# Patient Record
Sex: FEMALE | Race: WHITE | NOT HISPANIC OR LATINO | Employment: FULL TIME | URBAN - METROPOLITAN AREA
[De-identification: names, ages, dates, MRNs, and addresses within clinical notes are randomized per-mention and may not be internally consistent; named-entity substitution may affect disease eponyms.]

---

## 2017-08-23 DIAGNOSIS — Z92.89 HISTORY OF POSITIVE PPD: Primary | ICD-10-CM

## 2017-08-24 ENCOUNTER — HOSPITAL ENCOUNTER (OUTPATIENT)
Dept: LAB | Facility: CLINIC | Age: 37
Discharge: HOME | End: 2017-08-24
Attending: FAMILY MEDICINE | Admitting: PEDIATRICS
Payer: COMMERCIAL

## 2017-08-24 DIAGNOSIS — Z92.89 HISTORY OF POSITIVE PPD: ICD-10-CM

## 2017-08-24 PROCEDURE — 86480 TB TEST CELL IMMUN MEASURE: CPT | Performed by: PEDIATRICS

## 2017-08-24 PROCEDURE — 36415 COLL VENOUS BLD VENIPUNCTURE: CPT | Performed by: PEDIATRICS

## 2017-08-28 LAB
M TB TUBERC IFN-G BLD QL: NEGATIVE
M TB TUBERC IFN-G/MITOGEN IGNF BLD: 0 IU/ML

## 2020-03-11 ENCOUNTER — HEALTH MAINTENANCE LETTER (OUTPATIENT)
Age: 40
End: 2020-03-11

## 2020-12-27 ENCOUNTER — HEALTH MAINTENANCE LETTER (OUTPATIENT)
Age: 40
End: 2020-12-27

## 2021-03-09 ENCOUNTER — TRANSFERRED RECORDS (OUTPATIENT)
Dept: HEALTH INFORMATION MANAGEMENT | Facility: CLINIC | Age: 41
End: 2021-03-09

## 2021-04-25 ENCOUNTER — HEALTH MAINTENANCE LETTER (OUTPATIENT)
Age: 41
End: 2021-04-25

## 2021-06-08 ENCOUNTER — MEDICAL CORRESPONDENCE (OUTPATIENT)
Dept: HEALTH INFORMATION MANAGEMENT | Facility: CLINIC | Age: 41
End: 2021-06-08

## 2021-06-08 ENCOUNTER — HOSPITAL ENCOUNTER (EMERGENCY)
Facility: CLINIC | Age: 41
Discharge: HOME | End: 2021-06-08
Attending: PHYSICIAN ASSISTANT | Admitting: PHYSICIAN ASSISTANT
Payer: COMMERCIAL

## 2021-06-08 ENCOUNTER — APPOINTMENT (OUTPATIENT)
Dept: CT IMAGING | Facility: CLINIC | Age: 41
End: 2021-06-08
Attending: PHYSICIAN ASSISTANT
Payer: COMMERCIAL

## 2021-06-08 VITALS
DIASTOLIC BLOOD PRESSURE: 66 MMHG | TEMPERATURE: 98 F | RESPIRATION RATE: 14 BRPM | SYSTOLIC BLOOD PRESSURE: 94 MMHG | HEART RATE: 93 BPM | OXYGEN SATURATION: 100 %

## 2021-06-08 DIAGNOSIS — R42 VERTIGO: ICD-10-CM

## 2021-06-08 DIAGNOSIS — R51.9 HEADACHE: ICD-10-CM

## 2021-06-08 LAB
ANION GAP SERPL CALCULATED.3IONS-SCNC: 6 MMOL/L (ref 3–14)
BASOPHILS # BLD AUTO: 0.1 10E9/L (ref 0–0.2)
BASOPHILS NFR BLD AUTO: 1.1 %
BUN SERPL-MCNC: 12 MG/DL (ref 7–30)
CALCIUM SERPL-MCNC: 8.7 MG/DL (ref 8.5–10.1)
CHLORIDE SERPL-SCNC: 109 MMOL/L (ref 94–109)
CO2 SERPL-SCNC: 27 MMOL/L (ref 20–32)
CREAT SERPL-MCNC: 0.8 MG/DL (ref 0.52–1.04)
DIFFERENTIAL METHOD BLD: NORMAL
EOSINOPHIL # BLD AUTO: 0.1 10E9/L (ref 0–0.7)
EOSINOPHIL NFR BLD AUTO: 1.8 %
ERYTHROCYTE [DISTWIDTH] IN BLOOD BY AUTOMATED COUNT: 11.9 % (ref 10–15)
GFR SERPL CREATININE-BSD FRML MDRD: >90 ML/MIN/{1.73_M2}
GLUCOSE SERPL-MCNC: 93 MG/DL (ref 70–99)
HCG SERPL QL: NEGATIVE
HCT VFR BLD AUTO: 41.2 % (ref 35–47)
HGB BLD-MCNC: 13.8 G/DL (ref 11.7–15.7)
IMM GRANULOCYTES # BLD: 0 10E9/L (ref 0–0.4)
IMM GRANULOCYTES NFR BLD: 0.7 %
LYMPHOCYTES # BLD AUTO: 1.3 10E9/L (ref 0.8–5.3)
LYMPHOCYTES NFR BLD AUTO: 28.5 %
MCH RBC QN AUTO: 30.3 PG (ref 26.5–33)
MCHC RBC AUTO-ENTMCNC: 33.5 G/DL (ref 31.5–36.5)
MCV RBC AUTO: 91 FL (ref 78–100)
MONOCYTES # BLD AUTO: 0.4 10E9/L (ref 0–1.3)
MONOCYTES NFR BLD AUTO: 9.9 %
NEUTROPHILS # BLD AUTO: 2.6 10E9/L (ref 1.6–8.3)
NEUTROPHILS NFR BLD AUTO: 58 %
NRBC # BLD AUTO: 0 10*3/UL
NRBC BLD AUTO-RTO: 0 /100
PLATELET # BLD AUTO: 228 10E9/L (ref 150–450)
POTASSIUM SERPL-SCNC: 3.6 MMOL/L (ref 3.4–5.3)
RBC # BLD AUTO: 4.55 10E12/L (ref 3.8–5.2)
SODIUM SERPL-SCNC: 142 MMOL/L (ref 133–144)
WBC # BLD AUTO: 4.5 10E9/L (ref 4–11)

## 2021-06-08 PROCEDURE — 258N000003 HC RX IP 258 OP 636: Performed by: PHYSICIAN ASSISTANT

## 2021-06-08 PROCEDURE — 96361 HYDRATE IV INFUSION ADD-ON: CPT

## 2021-06-08 PROCEDURE — 250N000011 HC RX IP 250 OP 636: Performed by: PHYSICIAN ASSISTANT

## 2021-06-08 PROCEDURE — 80048 BASIC METABOLIC PNL TOTAL CA: CPT | Performed by: PHYSICIAN ASSISTANT

## 2021-06-08 PROCEDURE — 36415 COLL VENOUS BLD VENIPUNCTURE: CPT | Performed by: PHYSICIAN ASSISTANT

## 2021-06-08 PROCEDURE — 93005 ELECTROCARDIOGRAM TRACING: CPT

## 2021-06-08 PROCEDURE — 85025 COMPLETE CBC W/AUTO DIFF WBC: CPT | Performed by: PHYSICIAN ASSISTANT

## 2021-06-08 PROCEDURE — 84703 CHORIONIC GONADOTROPIN ASSAY: CPT | Performed by: PHYSICIAN ASSISTANT

## 2021-06-08 PROCEDURE — 70496 CT ANGIOGRAPHY HEAD: CPT

## 2021-06-08 PROCEDURE — 96374 THER/PROPH/DIAG INJ IV PUSH: CPT | Mod: 59

## 2021-06-08 PROCEDURE — 250N000009 HC RX 250: Performed by: PHYSICIAN ASSISTANT

## 2021-06-08 PROCEDURE — 99285 EMERGENCY DEPT VISIT HI MDM: CPT | Mod: 25

## 2021-06-08 PROCEDURE — 70450 CT HEAD/BRAIN W/O DYE: CPT

## 2021-06-08 PROCEDURE — 96375 TX/PRO/DX INJ NEW DRUG ADDON: CPT

## 2021-06-08 RX ORDER — SODIUM CHLORIDE 9 MG/ML
INJECTION, SOLUTION INTRAVENOUS CONTINUOUS
Status: DISCONTINUED | OUTPATIENT
Start: 2021-06-08 | End: 2021-06-08 | Stop reason: HOSPADM

## 2021-06-08 RX ORDER — KETOROLAC TROMETHAMINE 15 MG/ML
15 INJECTION, SOLUTION INTRAMUSCULAR; INTRAVENOUS ONCE
Status: COMPLETED | OUTPATIENT
Start: 2021-06-08 | End: 2021-06-08

## 2021-06-08 RX ORDER — IOPAMIDOL 755 MG/ML
500 INJECTION, SOLUTION INTRAVASCULAR ONCE
Status: COMPLETED | OUTPATIENT
Start: 2021-06-08 | End: 2021-06-08

## 2021-06-08 RX ORDER — METOCLOPRAMIDE HYDROCHLORIDE 5 MG/ML
10 INJECTION INTRAMUSCULAR; INTRAVENOUS ONCE
Status: COMPLETED | OUTPATIENT
Start: 2021-06-08 | End: 2021-06-08

## 2021-06-08 RX ORDER — DIPHENHYDRAMINE HYDROCHLORIDE 50 MG/ML
25 INJECTION INTRAMUSCULAR; INTRAVENOUS ONCE
Status: COMPLETED | OUTPATIENT
Start: 2021-06-08 | End: 2021-06-08

## 2021-06-08 RX ORDER — MECLIZINE HYDROCHLORIDE 25 MG/1
25 TABLET ORAL 3 TIMES DAILY PRN
Qty: 30 TABLET | Refills: 0 | Status: SHIPPED | OUTPATIENT
Start: 2021-06-08

## 2021-06-08 RX ORDER — DEXAMETHASONE SODIUM PHOSPHATE 10 MG/ML
10 INJECTION, SOLUTION INTRAMUSCULAR; INTRAVENOUS ONCE
Status: COMPLETED | OUTPATIENT
Start: 2021-06-08 | End: 2021-06-08

## 2021-06-08 RX ADMIN — METOCLOPRAMIDE HYDROCHLORIDE 10 MG: 5 INJECTION INTRAMUSCULAR; INTRAVENOUS at 12:52

## 2021-06-08 RX ADMIN — SODIUM CHLORIDE 1000 ML: 9 INJECTION, SOLUTION INTRAVENOUS at 12:52

## 2021-06-08 RX ADMIN — KETOROLAC TROMETHAMINE 15 MG: 15 INJECTION, SOLUTION INTRAMUSCULAR; INTRAVENOUS at 12:52

## 2021-06-08 RX ADMIN — IOPAMIDOL 70 ML: 755 INJECTION, SOLUTION INTRAVENOUS at 13:41

## 2021-06-08 RX ADMIN — SODIUM CHLORIDE 80 ML: 9 INJECTION, SOLUTION INTRAVENOUS at 13:41

## 2021-06-08 RX ADMIN — DEXAMETHASONE SODIUM PHOSPHATE 10 MG: 10 INJECTION, SOLUTION INTRAMUSCULAR; INTRAVENOUS at 12:52

## 2021-06-08 RX ADMIN — DIPHENHYDRAMINE HYDROCHLORIDE 25 MG: 50 INJECTION INTRAMUSCULAR; INTRAVENOUS at 12:52

## 2021-06-08 ASSESSMENT — ENCOUNTER SYMPTOMS
SPEECH DIFFICULTY: 1
HEADACHES: 1
DIZZINESS: 1
NUMBNESS: 0

## 2021-06-08 NOTE — ED TRIAGE NOTES
Patient presents with intermittent dizziness for past month, history of migraines. Dizziness is getting worse in the past 2 weeks. MRI normal 1 month ago. Slight headache on left side.

## 2021-06-08 NOTE — ED PROVIDER NOTES
"  History   Chief Complaint:  Dizziness       The history is provided by the patient.      Aretha Zavala is a 40 year old female with a history of migraines who presents with 2 weeks of intermittent dizziness. The patient reports that these episodes are spontaneous, but does feel that they occur more frequently when she stands up quickly or walks at a fast pace. She does clarify that her symptoms are less of a spinning dizziness and moreso feel like she is being \"pulled over to the left\". Symptoms do improves slightly when she sits down. Episodes typically last for a few minutes, though she did have an episode today that has been ongoing since onset at 8:30 a.m. When her dizziness is at its worst, she feels that she has to focus harder to see out of her left eye, but does not explicitly endorse any visual disturbance. The patient reports that she is still able to speak clearly during the episodes, but does feel that \"words get stuck\". She also notes that she had a headache today that felt different than her usual history of migraines, reports feeling more pressure. The patient denies any numbness to her face, arms, or legs. She further denies any recent trauma or injury. Patient is not currently anticoagulated.    Review of Systems   Eyes: Visual disturbance: has to focus more with L eye.   Neurological: Positive for dizziness, speech difficulty (\"words get stuck\", able to speak clearly) and headaches. Negative for numbness.   All other systems reviewed and are negative.    Allergies:  No Known Drug Allergies    Medications:  Hydroxychloroquine  Rizatriptan  Albuterol inhaler  Bupropion  Omeprazole  Symbicort inhaler    Past Medical History:    Anxiety  Depression  Inflammatory arthritis  Migraine headaches  Subclinical hypothyroidism  Asthma  GERD    Past Surgical History:    Tonsillectomy  [place] teeth extraction   section x2  Pelvic laparoscopy    Family History:    Father - diabetes  Social History:  The " patient was accompanied to the ED by her .  Marital status: Marries  Smoking Status: Negative  Alcohol Use: Negative    Physical Exam     Patient Vitals for the past 24 hrs:   BP Temp Temp src Pulse Resp SpO2   06/08/21 1330 96/65 -- -- 75 -- 100 %   06/08/21 1315 92/53 -- -- 102 -- 97 %   06/08/21 1300 106/70 -- -- -- -- 100 %   06/08/21 1210 116/82 98  F (36.7  C) Temporal 91 14 100 %     Physical Exam  Vitals signs and nursing note reviewed.   Constitutional:       General: She is not in acute distress.     Appearance: She is not diaphoretic.   HENT:      Head: Normocephalic and atraumatic.      Mouth/Throat:      Pharynx: No oropharyngeal exudate.   Eyes:      General: No scleral icterus.     Extraocular Movements: Extraocular movements intact.      Pupils: Pupils are equal, round, and reactive to light.      Comments: Uniclonal horizontal nystagmus. No gross visual field deficits   Cardiovascular:      Heart sounds: Normal heart sounds.   Pulmonary:      Effort: No respiratory distress.      Breath sounds: Normal breath sounds.   Abdominal:      General: Bowel sounds are normal.      Palpations: Abdomen is soft.      Tenderness: There is no abdominal tenderness.   Musculoskeletal:         General: No tenderness.   Skin:     General: Skin is warm.      Capillary Refill: Capillary refill takes 2 to 3 seconds.      Findings: No rash.   Neurological:      Mental Status: She is oriented to person, place, and time. Mental status is at baseline.      Cranial Nerves: No cranial nerve deficit.      Motor: No weakness.      Comments: CN intact, motor intact, sensory intact, no deficit finger-finger-nose or heel-shin testing, unremarkable HINTS examination for central process       Emergency Department Course     ECG:  Indication: Dizziness  Completed at 1316.  Read at 1317 by Dr. Lieberman.   Normal sinus rhythm  Possible Left atrial enlargement  Low voltage QRS  Nonspecific St abnormality  Prolonged QT   Rate 100  bpm. KS interval 114. QRS duration 72. QT/QTc 380/490. P-R-T axes 78 80 61.    Imaging:  Head CT w/o contrast:  Normal head CT.   Report per radiology.    CTA Head Neck with Contrast:  Normal neck and head CTA.  Report per radiology.    Laboratory:  CBC: WBC 4.5, HGB 13.8,   BMP: AWNL (Creatinine 0.80)    HCG qualitative blood: Negative    Emergency Department Course:    Reviewed:  I reviewed the patient's nursing notes, vitals and care everywhere.     Assessments:  1230 I performed an exam of the patient in room 29 as documented above.  1416 Patient rechecked and updated on findings.      Interventions:  1252 NS 1L IV Bolus  1252 Reglan 10 mg IV  1252 Benadryl 25 mg IV  1252 Decadron 10 mg IV  1252 Toradol 15 mg IV    Disposition:  The patient was discharged to home.     Impression & Plan     Medical Decision Making:  Aretha Zavala is a 40 year old female who presents with headache, vertigo. She has no focal neurological deficits and clinically appears most c/w peripheral vertigo. MR imaging as an outpatient was reassuring. Given her persistent headaches and symptoms, CTA obtained to evaluate for obvious vertebral artery dissection or abnormality, CT head obtained to evaluate for subarachnoid hemorrhage. These were unremarkable. Her symptoms improved in the ED. Plan for outpatient symptomatic management and ENT follow up.     Diagnosis:    ICD-10-CM    1. Vertigo  R42    2. Headache  R51.9        Discharge Medications:   New Prescriptions    MECLIZINE (ANTIVERT) 25 MG TABLET    Take 1 tablet (25 mg) by mouth 3 times daily as needed for dizziness       Scribe Disclosure:  I, Nyla De Guzman, am serving as a scribe at 12:30 PM on 6/8/2021 to document services personally performed by José Luis Perea PA based on my observations and the provider's statements to me.     This note was completed in part using Dragon voice recognition software. Although reviewed after completion, some word and grammatical  errors may occur.     Nyla De Guzman  6/8/2021   [facility] EMERGENCY DEPARTMENT         José Luis Perea PA-C  06/08/21 0233

## 2021-06-08 NOTE — ED NOTES
"Pt states she feels a little anxious and \"pins and needles\" post IV medications. Pt states these symptoms. Per MD, this may be a side effect of the Reglan. Pt ok with monitoring symptoms for now.   "

## 2021-06-09 ENCOUNTER — TRANSCRIBE ORDERS (OUTPATIENT)
Dept: OTHER | Age: 41
End: 2021-06-09

## 2021-06-09 ENCOUNTER — TELEPHONE (OUTPATIENT)
Dept: OTOLARYNGOLOGY | Facility: CLINIC | Age: 41
End: 2021-06-09

## 2021-06-09 DIAGNOSIS — R42 VERTIGO: Primary | ICD-10-CM

## 2021-06-09 LAB — INTERPRETATION ECG - MUSE: NORMAL

## 2021-06-09 NOTE — TELEPHONE ENCOUNTER
[facility] Call Center    Phone Message    May a detailed message be left on voicemail: no     Reason for Call: Appointment Intake    Referring Provider Name: Dr. Ozzy Castillo at [facility]  Diagnosis and/or Symptoms: Vertigo    Action Taken: Message routed to:  [facility] CLINIC COORDINATORS-EYE-DENTAL-ENT- [[id number]]    Travel Screening: Not Applicable

## 2021-06-11 ENCOUNTER — TELEPHONE (OUTPATIENT)
Dept: OTOLARYNGOLOGY | Facility: CLINIC | Age: 41
End: 2021-06-11

## 2021-06-11 NOTE — TELEPHONE ENCOUNTER
1. Have you noticed any changes in hearing? Yes  2. Do you have ringing, buzzing, or other sounds in your ears or head, this is also referred to as Tinnitus? Sometimes: sometimes   3. When and where was your last hearing test? [facility] recently (didn't specify when)  4. Do you feel lightheaded or foggy? Yes  5. Do you have a spinning sensation? Yes  6. Is there any specific position that can bring on dizziness? Standing up- no position   7. Does looking up cause dizziness? No  8. Does getting in and our of bed cause dizziness? Yes  9. Does turning over in bed increase or cause dizziness? Yes  10. Does bending over cause dizziness? Yes  11. Is there anything that you can do to prevent the dizziness? Sit down or lay down   12. Has the dizziness gotten better with time? No  13. Have you seen Physical Therapy for dizziness? (Please indicate clinic and as much of the location as possible): No  14. Are you being referred to a specific physician? No  15. Have you been evaluated/treated for your dizziness at any other location?  (If yes,obtian as much clinic/provider/locaiton as possible) Yes. (If yes answer the following questions:)   Have you seen any ENT, Neurology, or other providers for these symptoms?             Yes, If yes, where? [facility] if yes, who?    Have you had any balance or Audiology testing? No Have you had an MRI or CT scan of your head or neck? Yes, If yes, where? [facility]- MRI                   CT - [facility]  if yes, who?     Would you like to receive your Release of Information by mail or e-mail?  e-mail

## 2021-06-14 NOTE — TELEPHONE ENCOUNTER
FUTURE VISIT INFORMATION      FUTURE VISIT INFORMATION:    Date: 8/24/201    Time: 10AM    [facility]  REFERRAL INFORMATION:    Referring provider:  Ozzy Castillo MD     Referring providers clinic:  [facility]      Reason for visit/diagnosis  Vertigo- Referred by Dr. Ozzy Castillo at [facility]    RECORDS REQUESTED FROM:       Clinic name Comments Records Status Imaging Status   [facility]   3/6/2021 note from Ozzy Castillo MD  Care everywhere     [facility] imaging  4/19/2021 MR Brain and US Thyroid Care everywhere  Req 6/14/21 - PACS   [facility]  6/8/2021 ED note with José Luis Perea PA-C  6/8/2021 CTA HEad NEck   6/8/2021 CT Head Epic PACS   [facility]  6/9/2021 audiogram with Hannah Iraheta AU.D.   req 6/14/2021, 6/24/21 - received                   6/14/2021 11:03AM sent a fax to [facility] for images and audio - Am[facility]  6/24/2021 1:55PM [facility] imaging received, called [facility] records for audio, audio will be sent today - Amay   *2:19PM audio received, sent to scan. Delayed message sent to audiology to review  - [facility]

## 2021-07-02 DIAGNOSIS — R42 DIZZINESS: Primary | ICD-10-CM

## 2021-07-02 NOTE — TELEPHONE ENCOUNTER
"Requested orders for hearing test, vestibular PT eval, and VNG testing prior to ENT appointment with Dr. Nissen on 8/24/21.      Per Chart Review: Patient was seen in ED on 6/8/21 for ongoing issues with intermittent dizziness for a few weeks, as well as headache/head pressure. Dizziness reportedly typically lasts for a few minutes. Patient reported when dizziness is bad it is hard to see out of the left eye, and occasionally \"words get stuck\". CT scan was unremarkable. Patient has a history of migraines.    Audio 3/9/21 from [facility] showed normal hearing bilaterally.     Dizzy Intake: Patient reported dizziness is triggered by getting in and out of bed, turning over in bed, and bending over.      Smiley Hartman.  Licensed Audiologist  [place] # [id number]    "

## 2021-07-09 ENCOUNTER — TELEPHONE (OUTPATIENT)
Dept: AUDIOLOGY | Facility: CLINIC | Age: 41
End: 2021-07-09

## 2021-07-20 NOTE — TELEPHONE ENCOUNTER
- LVM 2 x to schedule Vest PT, WIN, and VNG prior to Dr. Nissen. Gave direct number. Also stated this needs to get done prior to ENT    Gave [facility] number for physical therapy    Gave direct number for WIN and VNG

## 2021-07-30 NOTE — TELEPHONE ENCOUNTER
Patient said you can reach her today on her cell at [phone]. If she doesn't answer, please call her work at [phone] and someone can get her to talk.

## 2021-08-03 ENCOUNTER — HOSPITAL ENCOUNTER (OUTPATIENT)
Dept: PHYSICAL THERAPY | Facility: CLINIC | Age: 41
Setting detail: THERAPIES SERIES
End: 2021-08-03
Attending: OTOLARYNGOLOGY
Payer: COMMERCIAL

## 2021-08-03 PROCEDURE — 97162 PT EVAL MOD COMPLEX 30 MIN: CPT | Mod: GP | Performed by: PHYSICAL THERAPIST
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
## 2021-08-10 ENCOUNTER — TELEPHONE (OUTPATIENT)
Dept: AUDIOLOGY | Facility: CLINIC | Age: 41
End: 2021-08-10

## 2021-08-10 NOTE — TELEPHONE ENCOUNTER
"\"I m calling from the Audiology and Balance Testing department at the . This is just a call to remind you of your upcoming Balance Testing appointment on [Date], and to see if you have any questions or concerns regarding the balance testing you'll be doing. You should have received an itinerary via mail or via Vocab, if you are active, that goes over what to expect and explains the dos and don ts both 48 hours before, and the day of. There is a list of medications for you to review on the itinerary that we would like you to stop taking beforehand. If you didn t receive the itinerary or you still have questions, please give our clinic a call at [phone]. Otherwise, we will see you on [Date] starting at [Time].\"    Please send encounter if patient would like to reschedule.  "

## 2021-08-13 DIAGNOSIS — R42 DIZZINESS: Primary | ICD-10-CM

## 2021-08-16 ENCOUNTER — OFFICE VISIT (OUTPATIENT)
Dept: AUDIOLOGY | Facility: CLINIC | Age: 41
End: 2021-08-16
Payer: COMMERCIAL

## 2021-08-16 DIAGNOSIS — R42 DIZZINESS AND GIDDINESS: Primary | ICD-10-CM

## 2021-08-16 PROCEDURE — 92541 SPONTANEOUS NYSTAGMUS TEST: CPT | Performed by: AUDIOLOGIST

## 2021-08-16 PROCEDURE — 92542 POSITIONAL NYSTAGMUS TEST: CPT | Mod: 59 | Performed by: AUDIOLOGIST

## 2021-08-16 PROCEDURE — 92567 TYMPANOMETRY: CPT | Performed by: AUDIOLOGIST

## 2021-08-16 PROCEDURE — 92545 OSCILLATING TRACKING TEST: CPT | Mod: 59 | Performed by: AUDIOLOGIST

## 2021-08-16 PROCEDURE — 92537 CALORIC VSTBLR TEST W/REC: CPT | Performed by: AUDIOLOGIST
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
## 2021-08-24 ENCOUNTER — PRE VISIT (OUTPATIENT)
Dept: OTOLARYNGOLOGY | Facility: CLINIC | Age: 41
End: 2021-08-24

## 2021-09-05 ENCOUNTER — APPOINTMENT (OUTPATIENT)
Dept: GENERAL RADIOLOGY | Facility: CLINIC | Age: 41
End: 2021-09-05
Attending: EMERGENCY MEDICINE
Payer: COMMERCIAL

## 2021-09-05 ENCOUNTER — HOSPITAL ENCOUNTER (OUTPATIENT)
Facility: CLINIC | Age: 41
Setting detail: OBSERVATION
Discharge: HOME | End: 2021-09-06
Attending: EMERGENCY MEDICINE | Admitting: HOSPITALIST
Payer: COMMERCIAL

## 2021-09-05 DIAGNOSIS — J45.901 EXACERBATION OF ASTHMA, UNSPECIFIED ASTHMA SEVERITY, UNSPECIFIED WHETHER PERSISTENT: ICD-10-CM

## 2021-09-05 DIAGNOSIS — J20.5 ACUTE BRONCHITIS DUE TO RESPIRATORY SYNCYTIAL VIRUS (RSV): Primary | ICD-10-CM

## 2021-09-05 LAB
ANION GAP SERPL CALCULATED.3IONS-SCNC: 10 MMOL/L (ref 3–14)
BASOPHILS # BLD AUTO: 0.1 10E3/UL (ref 0–0.2)
BASOPHILS NFR BLD AUTO: 1 %
BUN SERPL-MCNC: 9 MG/DL (ref 7–30)
CALCIUM SERPL-MCNC: 9.3 MG/DL (ref 8.5–10.1)
CHLORIDE BLD-SCNC: 108 MMOL/L (ref 94–109)
CO2 SERPL-SCNC: 23 MMOL/L (ref 20–32)
CREAT SERPL-MCNC: 0.79 MG/DL (ref 0.52–1.04)
D DIMER PPP FEU-MCNC: <0.27 UG/ML FEU (ref 0–0.5)
EOSINOPHIL # BLD AUTO: 0.1 10E3/UL (ref 0–0.7)
EOSINOPHIL NFR BLD AUTO: 1 %
ERYTHROCYTE [DISTWIDTH] IN BLOOD BY AUTOMATED COUNT: 12.3 % (ref 10–15)
GFR SERPL CREATININE-BSD FRML MDRD: >90 ML/MIN/1.73M2
GLUCOSE BLD-MCNC: 86 MG/DL (ref 70–99)
HCT VFR BLD AUTO: 43.6 % (ref 35–47)
HGB BLD-MCNC: 14.9 G/DL (ref 11.7–15.7)
IMM GRANULOCYTES # BLD: 0.4 10E3/UL
IMM GRANULOCYTES NFR BLD: 3 %
LYMPHOCYTES # BLD AUTO: 1.6 10E3/UL (ref 0.8–5.3)
LYMPHOCYTES NFR BLD AUTO: 13 %
MCH RBC QN AUTO: 31 PG (ref 26.5–33)
MCHC RBC AUTO-ENTMCNC: 34.2 G/DL (ref 31.5–36.5)
MCV RBC AUTO: 91 FL (ref 78–100)
MONOCYTES # BLD AUTO: 1.2 10E3/UL (ref 0–1.3)
MONOCYTES NFR BLD AUTO: 10 %
NEUTROPHILS # BLD AUTO: 8.7 10E3/UL (ref 1.6–8.3)
NEUTROPHILS NFR BLD AUTO: 72 %
NRBC # BLD AUTO: 0 10E3/UL
NRBC BLD AUTO-RTO: 0 /100
PLATELET # BLD AUTO: 266 10E3/UL (ref 150–450)
POTASSIUM BLD-SCNC: 3 MMOL/L (ref 3.4–5.3)
POTASSIUM BLD-SCNC: 3.6 MMOL/L (ref 3.4–5.3)
RBC # BLD AUTO: 4.8 10E6/UL (ref 3.8–5.2)
SARS-COV-2 RNA RESP QL NAA+PROBE: NEGATIVE
SODIUM SERPL-SCNC: 141 MMOL/L (ref 133–144)
TROPONIN I SERPL-MCNC: <0.015 UG/L (ref 0–0.04)
WBC # BLD AUTO: 12.1 10E3/UL (ref 4–11)

## 2021-09-05 PROCEDURE — 87633 RESP VIRUS 12-25 TARGETS: CPT | Performed by: EMERGENCY MEDICINE

## 2021-09-05 PROCEDURE — 250N000013 HC RX MED GY IP 250 OP 250 PS 637: Performed by: PHYSICIAN ASSISTANT

## 2021-09-05 PROCEDURE — 96375 TX/PRO/DX INJ NEW DRUG ADDON: CPT

## 2021-09-05 PROCEDURE — 94640 AIRWAY INHALATION TREATMENT: CPT

## 2021-09-05 PROCEDURE — 258N000003 HC RX IP 258 OP 636: Performed by: EMERGENCY MEDICINE

## 2021-09-05 PROCEDURE — 250N000009 HC RX 250: Performed by: PHYSICIAN ASSISTANT

## 2021-09-05 PROCEDURE — 94640 AIRWAY INHALATION TREATMENT: CPT | Mod: 76

## 2021-09-05 PROCEDURE — 85025 COMPLETE CBC W/AUTO DIFF WBC: CPT | Performed by: EMERGENCY MEDICINE

## 2021-09-05 PROCEDURE — 87581 M.PNEUMON DNA AMP PROBE: CPT | Performed by: EMERGENCY MEDICINE

## 2021-09-05 PROCEDURE — 84484 ASSAY OF TROPONIN QUANT: CPT | Performed by: EMERGENCY MEDICINE

## 2021-09-05 PROCEDURE — 71045 X-RAY EXAM CHEST 1 VIEW: CPT

## 2021-09-05 PROCEDURE — 85379 FIBRIN DEGRADATION QUANT: CPT | Performed by: EMERGENCY MEDICINE

## 2021-09-05 PROCEDURE — C9803 HOPD COVID-19 SPEC COLLECT: HCPCS

## 2021-09-05 PROCEDURE — 36415 COLL VENOUS BLD VENIPUNCTURE: CPT | Performed by: PHYSICIAN ASSISTANT

## 2021-09-05 PROCEDURE — 250N000011 HC RX IP 250 OP 636: Performed by: EMERGENCY MEDICINE

## 2021-09-05 PROCEDURE — 250N000009 HC RX 250: Performed by: EMERGENCY MEDICINE

## 2021-09-05 PROCEDURE — 96361 HYDRATE IV INFUSION ADD-ON: CPT

## 2021-09-05 PROCEDURE — G0378 HOSPITAL OBSERVATION PER HR: HCPCS

## 2021-09-05 PROCEDURE — 99220 PR INITIAL OBSERVATION CARE,LEVEL III: CPT | Performed by: PHYSICIAN ASSISTANT

## 2021-09-05 PROCEDURE — 93005 ELECTROCARDIOGRAM TRACING: CPT

## 2021-09-05 PROCEDURE — 99285 EMERGENCY DEPT VISIT HI MDM: CPT | Mod: 25

## 2021-09-05 PROCEDURE — 250N000009 HC RX 250

## 2021-09-05 PROCEDURE — 96365 THER/PROPH/DIAG IV INF INIT: CPT

## 2021-09-05 PROCEDURE — 258N000003 HC RX IP 258 OP 636: Performed by: PHYSICIAN ASSISTANT

## 2021-09-05 PROCEDURE — 84132 ASSAY OF SERUM POTASSIUM: CPT | Performed by: PHYSICIAN ASSISTANT

## 2021-09-05 PROCEDURE — 80048 BASIC METABOLIC PNL TOTAL CA: CPT | Performed by: EMERGENCY MEDICINE

## 2021-09-05 PROCEDURE — 999N000157 HC STATISTIC RCP TIME EA 10 MIN

## 2021-09-05 PROCEDURE — 36415 COLL VENOUS BLD VENIPUNCTURE: CPT | Performed by: EMERGENCY MEDICINE

## 2021-09-05 PROCEDURE — 87635 SARS-COV-2 COVID-19 AMP PRB: CPT | Performed by: EMERGENCY MEDICINE

## 2021-09-05 RX ORDER — PROCHLORPERAZINE MALEATE 5 MG
10 TABLET ORAL EVERY 6 HOURS PRN
Status: DISCONTINUED | OUTPATIENT
Start: 2021-09-05 | End: 2021-09-06 | Stop reason: HOSPADM

## 2021-09-05 RX ORDER — POLYETHYLENE GLYCOL 3350 17 G/17G
17 POWDER, FOR SOLUTION ORAL DAILY PRN
Status: DISCONTINUED | OUTPATIENT
Start: 2021-09-05 | End: 2021-09-06 | Stop reason: HOSPADM

## 2021-09-05 RX ORDER — AMOXICILLIN 250 MG
2 CAPSULE ORAL 2 TIMES DAILY PRN
Status: DISCONTINUED | OUTPATIENT
Start: 2021-09-05 | End: 2021-09-06 | Stop reason: HOSPADM

## 2021-09-05 RX ORDER — METHYLPREDNISOLONE SODIUM SUCCINATE 125 MG/2ML
60 INJECTION, POWDER, LYOPHILIZED, FOR SOLUTION INTRAMUSCULAR; INTRAVENOUS EVERY 12 HOURS
Status: DISCONTINUED | OUTPATIENT
Start: 2021-09-06 | End: 2021-09-06 | Stop reason: HOSPADM

## 2021-09-05 RX ORDER — ONDANSETRON 2 MG/ML
4 INJECTION INTRAMUSCULAR; INTRAVENOUS EVERY 6 HOURS PRN
Status: DISCONTINUED | OUTPATIENT
Start: 2021-09-05 | End: 2021-09-06 | Stop reason: HOSPADM

## 2021-09-05 RX ORDER — IPRATROPIUM BROMIDE AND ALBUTEROL SULFATE 2.5; .5 MG/3ML; MG/3ML
3 SOLUTION RESPIRATORY (INHALATION)
Status: DISCONTINUED | OUTPATIENT
Start: 2021-09-05 | End: 2021-09-06 | Stop reason: HOSPADM

## 2021-09-05 RX ORDER — ALBUTEROL SULFATE 0.83 MG/ML
2.5 SOLUTION RESPIRATORY (INHALATION) ONCE
Status: COMPLETED | OUTPATIENT
Start: 2021-09-05 | End: 2021-09-05

## 2021-09-05 RX ORDER — IPRATROPIUM BROMIDE AND ALBUTEROL SULFATE 2.5; .5 MG/3ML; MG/3ML
6 SOLUTION RESPIRATORY (INHALATION) ONCE
Status: COMPLETED | OUTPATIENT
Start: 2021-09-05 | End: 2021-09-05

## 2021-09-05 RX ORDER — PROCHLORPERAZINE 25 MG
25 SUPPOSITORY, RECTAL RECTAL EVERY 12 HOURS PRN
Status: DISCONTINUED | OUTPATIENT
Start: 2021-09-05 | End: 2021-09-06 | Stop reason: HOSPADM

## 2021-09-05 RX ORDER — SODIUM CHLORIDE, SODIUM LACTATE, POTASSIUM CHLORIDE, CALCIUM CHLORIDE 600; 310; 30; 20 MG/100ML; MG/100ML; MG/100ML; MG/100ML
INJECTION, SOLUTION INTRAVENOUS CONTINUOUS
Status: DISCONTINUED | OUTPATIENT
Start: 2021-09-05 | End: 2021-09-06 | Stop reason: HOSPADM

## 2021-09-05 RX ORDER — LEVOCETIRIZINE DIHYDROCHLORIDE 5 MG/1
5 TABLET, FILM COATED ORAL EVERY EVENING
COMMUNITY

## 2021-09-05 RX ORDER — BUPROPION HYDROCHLORIDE 300 MG/1
300 TABLET ORAL EVERY EVENING
COMMUNITY

## 2021-09-05 RX ORDER — BENZONATATE 100 MG/1
100 CAPSULE ORAL 3 TIMES DAILY PRN
Status: DISCONTINUED | OUTPATIENT
Start: 2021-09-05 | End: 2021-09-06 | Stop reason: HOSPADM

## 2021-09-05 RX ORDER — GUAIFENESIN 600 MG/1
600 TABLET, EXTENDED RELEASE ORAL 2 TIMES DAILY
Status: DISCONTINUED | OUTPATIENT
Start: 2021-09-05 | End: 2021-09-06 | Stop reason: HOSPADM

## 2021-09-05 RX ORDER — LEVOCETIRIZINE DIHYDROCHLORIDE 5 MG/1
5 TABLET, FILM COATED ORAL EVERY EVENING
Status: DISCONTINUED | OUTPATIENT
Start: 2021-09-05 | End: 2021-09-06 | Stop reason: HOSPADM

## 2021-09-05 RX ORDER — AMOXICILLIN 250 MG
1 CAPSULE ORAL 2 TIMES DAILY PRN
Status: DISCONTINUED | OUTPATIENT
Start: 2021-09-05 | End: 2021-09-06 | Stop reason: HOSPADM

## 2021-09-05 RX ORDER — HYDROXYCHLOROQUINE SULFATE 200 MG/1
400 TABLET, FILM COATED ORAL EVERY EVENING
COMMUNITY

## 2021-09-05 RX ORDER — MAGNESIUM SULFATE HEPTAHYDRATE 40 MG/ML
2 INJECTION, SOLUTION INTRAVENOUS ONCE
Status: COMPLETED | OUTPATIENT
Start: 2021-09-05 | End: 2021-09-05

## 2021-09-05 RX ORDER — IPRATROPIUM BROMIDE AND ALBUTEROL SULFATE 2.5; .5 MG/3ML; MG/3ML
SOLUTION RESPIRATORY (INHALATION)
Status: COMPLETED
Start: 2021-09-05 | End: 2021-09-05

## 2021-09-05 RX ORDER — HYDROXYCHLOROQUINE SULFATE 200 MG/1
400 TABLET, FILM COATED ORAL EVERY EVENING
Status: DISCONTINUED | OUTPATIENT
Start: 2021-09-05 | End: 2021-09-06 | Stop reason: HOSPADM

## 2021-09-05 RX ORDER — GUAIFENESIN, PSEUDOEPHEDRINE HYDROCHLORIDE 600; 60 MG/1; MG/1
1 TABLET, EXTENDED RELEASE ORAL 2 TIMES DAILY
Status: DISCONTINUED | OUTPATIENT
Start: 2021-09-05 | End: 2021-09-05 | Stop reason: RX

## 2021-09-05 RX ORDER — BUDESONIDE AND FORMOTEROL FUMARATE DIHYDRATE 160; 4.5 UG/1; UG/1
2 AEROSOL RESPIRATORY (INHALATION) 2 TIMES DAILY
Status: DISCONTINUED | OUTPATIENT
Start: 2021-09-05 | End: 2021-09-06

## 2021-09-05 RX ORDER — IBUPROFEN 600 MG/1
600 TABLET, FILM COATED ORAL EVERY 6 HOURS PRN
Status: DISCONTINUED | OUTPATIENT
Start: 2021-09-05 | End: 2021-09-06 | Stop reason: HOSPADM

## 2021-09-05 RX ORDER — ACETAMINOPHEN 325 MG/1
650 TABLET ORAL EVERY 6 HOURS PRN
Status: DISCONTINUED | OUTPATIENT
Start: 2021-09-05 | End: 2021-09-06 | Stop reason: HOSPADM

## 2021-09-05 RX ORDER — AZITHROMYCIN 250 MG/1
500 TABLET, FILM COATED ORAL ONCE
Status: DISCONTINUED | OUTPATIENT
Start: 2021-09-05 | End: 2021-09-05

## 2021-09-05 RX ORDER — ALBUTEROL SULFATE 0.83 MG/ML
2.5 SOLUTION RESPIRATORY (INHALATION)
Status: DISCONTINUED | OUTPATIENT
Start: 2021-09-05 | End: 2021-09-06 | Stop reason: HOSPADM

## 2021-09-05 RX ORDER — ALBUTEROL SULFATE 90 UG/1
2 AEROSOL, METERED RESPIRATORY (INHALATION) EVERY 6 HOURS PRN
COMMUNITY

## 2021-09-05 RX ORDER — MECLIZINE HYDROCHLORIDE 25 MG/1
25 TABLET ORAL 3 TIMES DAILY PRN
Status: DISCONTINUED | OUTPATIENT
Start: 2021-09-05 | End: 2021-09-06 | Stop reason: HOSPADM

## 2021-09-05 RX ORDER — BUDESONIDE AND FORMOTEROL FUMARATE DIHYDRATE 160; 4.5 UG/1; UG/1
2 AEROSOL RESPIRATORY (INHALATION) 2 TIMES DAILY
COMMUNITY

## 2021-09-05 RX ORDER — DOXYCYCLINE 100 MG/1
100 CAPSULE ORAL EVERY 12 HOURS SCHEDULED
Status: DISCONTINUED | OUTPATIENT
Start: 2021-09-05 | End: 2021-09-06 | Stop reason: HOSPADM

## 2021-09-05 RX ORDER — BUPROPION HYDROCHLORIDE 150 MG/1
300 TABLET ORAL EVERY EVENING
Status: DISCONTINUED | OUTPATIENT
Start: 2021-09-05 | End: 2021-09-06 | Stop reason: HOSPADM

## 2021-09-05 RX ORDER — RIZATRIPTAN BENZOATE 10 MG/1
10 TABLET, ORALLY DISINTEGRATING ORAL
COMMUNITY
End: 2022-01-26

## 2021-09-05 RX ORDER — ACETAMINOPHEN 650 MG/1
650 SUPPOSITORY RECTAL EVERY 6 HOURS PRN
Status: DISCONTINUED | OUTPATIENT
Start: 2021-09-05 | End: 2021-09-06 | Stop reason: HOSPADM

## 2021-09-05 RX ORDER — RIZATRIPTAN BENZOATE 10 MG/1
10 TABLET, ORALLY DISINTEGRATING ORAL
Status: DISCONTINUED | OUTPATIENT
Start: 2021-09-05 | End: 2021-09-06 | Stop reason: HOSPADM

## 2021-09-05 RX ORDER — AZITHROMYCIN 250 MG/1
250 TABLET, FILM COATED ORAL DAILY
Status: DISCONTINUED | OUTPATIENT
Start: 2021-09-06 | End: 2021-09-05

## 2021-09-05 RX ORDER — ONDANSETRON 4 MG/1
4 TABLET, ORALLY DISINTEGRATING ORAL EVERY 6 HOURS PRN
Status: DISCONTINUED | OUTPATIENT
Start: 2021-09-05 | End: 2021-09-06 | Stop reason: HOSPADM

## 2021-09-05 RX ORDER — METHYLPREDNISOLONE SODIUM SUCCINATE 125 MG/2ML
125 INJECTION, POWDER, LYOPHILIZED, FOR SOLUTION INTRAMUSCULAR; INTRAVENOUS ONCE
Status: COMPLETED | OUTPATIENT
Start: 2021-09-05 | End: 2021-09-05

## 2021-09-05 RX ORDER — PSEUDOEPHEDRINE HCL 30 MG
30 TABLET ORAL 4 TIMES DAILY
Status: DISCONTINUED | OUTPATIENT
Start: 2021-09-05 | End: 2021-09-06

## 2021-09-05 RX ORDER — IPRATROPIUM BROMIDE AND ALBUTEROL SULFATE 2.5; .5 MG/3ML; MG/3ML
6 SOLUTION RESPIRATORY (INHALATION) ONCE
Status: DISCONTINUED | OUTPATIENT
Start: 2021-09-05 | End: 2021-09-05

## 2021-09-05 RX ORDER — POTASSIUM CHLORIDE 1500 MG/1
40 TABLET, EXTENDED RELEASE ORAL ONCE
Status: COMPLETED | OUTPATIENT
Start: 2021-09-05 | End: 2021-09-05

## 2021-09-05 RX ADMIN — Medication 1 LOZENGE: at 22:52

## 2021-09-05 RX ADMIN — GUAIFENESIN 600 MG: 600 TABLET, EXTENDED RELEASE ORAL at 19:50

## 2021-09-05 RX ADMIN — BUPROPION HYDROCHLORIDE 300 MG: 150 TABLET, EXTENDED RELEASE ORAL at 19:50

## 2021-09-05 RX ADMIN — ALBUTEROL SULFATE 2.5 MG: 2.5 SOLUTION RESPIRATORY (INHALATION) at 13:32

## 2021-09-05 RX ADMIN — SODIUM CHLORIDE, POTASSIUM CHLORIDE, SODIUM LACTATE AND CALCIUM CHLORIDE: 600; 310; 30; 20 INJECTION, SOLUTION INTRAVENOUS at 16:14

## 2021-09-05 RX ADMIN — SODIUM CHLORIDE, POTASSIUM CHLORIDE, SODIUM LACTATE AND CALCIUM CHLORIDE 1000 ML: 600; 310; 30; 20 INJECTION, SOLUTION INTRAVENOUS at 14:26

## 2021-09-05 RX ADMIN — HYDROXYCHLOROQUINE SULFATE 400 MG: 200 TABLET, FILM COATED ORAL at 20:36

## 2021-09-05 RX ADMIN — IPRATROPIUM BROMIDE AND ALBUTEROL SULFATE 3 ML: .5; 3 SOLUTION RESPIRATORY (INHALATION) at 11:46

## 2021-09-05 RX ADMIN — MAGNESIUM SULFATE HEPTAHYDRATE 2 G: 2 INJECTION, SOLUTION INTRAVENOUS at 12:10

## 2021-09-05 RX ADMIN — IBUPROFEN 600 MG: 600 TABLET, FILM COATED ORAL at 22:52

## 2021-09-05 RX ADMIN — DOXYCYCLINE HYCLATE 100 MG: 100 CAPSULE ORAL at 17:04

## 2021-09-05 RX ADMIN — Medication 1 LOZENGE: at 19:50

## 2021-09-05 RX ADMIN — BENZONATATE 100 MG: 100 CAPSULE ORAL at 16:17

## 2021-09-05 RX ADMIN — IPRATROPIUM BROMIDE AND ALBUTEROL SULFATE 3 ML: .5; 3 SOLUTION RESPIRATORY (INHALATION) at 17:21

## 2021-09-05 RX ADMIN — IPRATROPIUM BROMIDE AND ALBUTEROL SULFATE 3 ML: .5; 3 SOLUTION RESPIRATORY (INHALATION) at 19:28

## 2021-09-05 RX ADMIN — POTASSIUM CHLORIDE 40 MEQ: 1500 TABLET, EXTENDED RELEASE ORAL at 17:04

## 2021-09-05 RX ADMIN — LEVOCETIRIZINE DIHYDROCHLORIDE 5 MG: 5 TABLET ORAL at 19:51

## 2021-09-05 RX ADMIN — ACETAMINOPHEN 650 MG: 325 TABLET, FILM COATED ORAL at 16:17

## 2021-09-05 RX ADMIN — IPRATROPIUM BROMIDE AND ALBUTEROL SULFATE 6 ML: .5; 3 SOLUTION RESPIRATORY (INHALATION) at 12:10

## 2021-09-05 RX ADMIN — METHYLPREDNISOLONE SODIUM SUCCINATE 125 MG: 125 INJECTION, POWDER, FOR SOLUTION INTRAMUSCULAR; INTRAVENOUS at 12:10

## 2021-09-05 ASSESSMENT — MIFFLIN-ST. JEOR: SCORE: 1201.6

## 2021-09-05 ASSESSMENT — ENCOUNTER SYMPTOMS
SHORTNESS OF BREATH: 1
COUGH: 1
FEVER: 1

## 2021-09-05 NOTE — ED PROVIDER NOTES
History   Chief Complaint:  Cough and Shortness of Breath       The history is provided by the patient.      Aretha Zavala is a 40 year old female with history of asthma who presents with cough and shortness of breath. Since 4 days ago, Aretha began experiencing a cough. 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 She's attempted inhalers at home but reports little relief.  She has also been on prednisone the last 3 days, though has taken none today. She performed 2 at-home COVID tests that both returned negative.  She reports this is the worst her asthma has ever been.  She notes mid sternal chest pain and notes elevated temperatures of 100-101.    Review of Systems   Constitutional: Positive for fever.   Respiratory: Positive for cough and shortness of breath.    Cardiovascular: Positive for chest pain.   All other systems reviewed and are negative.      Allergies:  Metoclopramide  Reglan  Gadobutrol    Medications:  Meclizine  Bupropion  Rizatriptan  Omeprazole   Hydroxychlorquine      Past Medical History:    Anxiety and depression  Inflammatory arthritis  Mild intermittent asthma without complication  Migraine syndrome   Subclinical hypothyroidism  Anxiety  Thyroid nodule  Rheumatoid arthritis   Varicella  GERD    Past Surgical History:    Tonsillectomy   Dental surgery procedure   section x2  Pelvic laparoscopy, perforated IUD  Breast surgery     Family History:    Father - diabetes, high cholesterol, hypertension  Mom - cancer, depression, osteoporosis, thyroid disorder    Social History:  The patient is accompanied by her  today in the ED.     Physical Exam     Patient Vitals for the past 24 hrs:   BP Temp Temp src Pulse Resp SpO2   21 1137 106/77 98.2  F (36.8  C) Oral 95 18 100 %       Physical Exam    Nursing note and vitals reviewed.  HENT:   Mouth/Throat: Moist mucous membranes.   Eyes: EOMI, nonicteric sclera  Cardiovascular: Normal rate, regular  rhythm, no murmurs, rubs, or gallops  Pulmonary/Chest: Frequent coughing, mildly increased effort.  Lungs sound clear bilaterally without wheezing/rhonchi.  Musculoskeletal: Normal range of motion.   Neurological: Alert. Moves all extremities spontaneously.   Skin: Skin is warm and dry. No rash noted.   Psychiatric: Normal mood and affect.       Emergency Department Course   ECG  ECG taken at 1202, ECG read at 1229  Sinus rhythm with short NY  Nonspecific ST abnormality   Abnormal ECG     No significant change as compared to prior, dated 06/08/21.  Rate 85 bpm. NY interval 108 ms. QRS duration 74 ms. QT/QTc 372/442 ms. P-R-T axes 83 82 65.     Imaging:    XR Chest Port 1 View  Preliminary Result  IMPRESSION: No evidence of acute cardiopulmonary disease is seen.  Imaging independently reviewed and agree with radiologist interpretation.     Laboratory:   CBC: WBC 12.1 (H), HGB 14.9,      Ddimer: <0.27    Troponin (Collected 1217): <0.015    BMP: Potassium: 3.0 (L);  o/w WNL (Creatinine 0.79)     Symptomatic Influenza A/B & SARS-CoV2 (COVID19) Virus PCR Multiplex: negative       Procedures  None    Emergency Department Course:    Reviewed:  I reviewed nursing notes, vitals, past medical history and care everywhere    Assessments:  1145 I obtained history and examined the patient as noted above.     1231 I rechecked the patient and explained findings. Patient expresses improvement.     Interventions:  1146 Duoneb 3 mL Nebulization    1210 Solu-Medrol 125 mg IV, Magnesium Sulfate 2 g IV, Duoneb 6 mL Nebulization     1332 Albuterol 2.5 mg Nebulization    Consults:  1434 I spoke with ELIE Tiwari of the hospitalist service from [facility] regarding patient's presentation, findings, and plan of care.    Disposition:  The patient was admitted to the hospital under the care of our hospitalist service.       Impression & Plan     Medical Decision Making:  Patient presents with history of asthma and suspected asthma  exacerbation.  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  On lung exam, she does not have significant wheezing, but is coughing quite frequently.  This did improve with multiple nebs, steroids, and magnesium.  However, she is still quite symptomatic, and required a repeat neb inside of 2 hours.  EKG negative for ischemia.  Troponin negative.  D-dimer also negative ruling out PE.  Her chest x-ray is negative for infiltrate.  Discussed with patient that were she not a pulmonologist, admission would be indicated for frequent nebs.  Patient reported that her  would prefer that she be admitted as well.  Therefore, discussed with our hospitalist service and spoke with HALLIE Tiwari, who accepts patient for admission.  All questions answered.    Covid-19  Aretha Zavala was evaluated during a global COVID-19 pandemic, which necessitated consideration that the patient might be at risk for infection with the SARS-CoV-2 virus that causes COVID-19.   Applicable protocols for evaluation were followed during the patient's care.   COVID-19 was considered as part of the patient's evaluation. The plan for testing is:  a test was obtained during this visit.    Diagnosis:    ICD-10-CM    1. Exacerbation of asthma, unspecified asthma severity, unspecified whether persistent  J45.901        Scribe Disclosure:  Aishwarya GAMBOA, am serving as a scribe at 11:44 AM on 9/5/2021 to document services personally performed by Daquan Schmidt MD based on my observations and the provider's statements to me.          Daquan Schmidt MD  09/05/21 [id number]

## 2021-09-05 NOTE — PHARMACY-ADMISSION MEDICATION HISTORY
Admission medication history interview status for this patient is complete. See [facility] admission navigator for allergy information, prior to admission medications and immunization status.     Medication history interview done, indicate source(s): Patient  Medication history resources (including written lists, pill bottles, clinic record): [employer] dispense records, [facility]  Pharmacy: [facility]    Changes made to PTA medication list:  Added: all meds  Changed: none  Reported as Not Taking: none  Removed: none    Actions taken by pharmacist (provider contacted, etc):None     Additional medication history information:None    Medication reconciliation/reorder completed by provider prior to medication history?  Y   (Y/N)         Prior to Admission medications    Medication Sig Last Dose Taking? Auth Provider   albuterol (PROAIR HFA/PROVENTIL HFA/VENTOLIN HFA) 108 (90 Base) MCG/ACT inhaler Inhale 2 puffs into the lungs every 6 hours as needed for shortness of breath / dyspnea or wheezing Past Week at Unknown time Yes Unknown, Entered By History   budesonide-formoterol (SYMBICORT) 160-4.5 MCG/ACT Inhaler Inhale 2 puffs into the lungs 2 times daily 9/5/2021 at am Yes Unknown, Entered By History   buPROPion (WELLBUTRIN XL) 300 MG 24 hr tablet Take 300 mg by mouth every evening 9/4/2021 at pm Yes Unknown, Entered By History   EPINEPHrine (EPIPEN IJ) Inject as directed once as needed Unknown at Unknown time Yes Unknown, Entered By History   hydroxychloroquine (PLAQUENIL) 200 MG tablet Take 400 mg by mouth every evening 9/4/2021 at pm Yes Unknown, Entered By History   levocetirizine (XYZAL) 5 MG tablet Take 5 mg by mouth every evening 9/4/2021 at pm Yes Unknown, Entered By History   meclizine (ANTIVERT) 25 MG tablet Take 1 tablet (25 mg) by mouth 3 times daily as needed for dizziness Unknown at Unknown time Yes José Luis Perea PA-C   omeprazole (PRILOSEC) 20 MG DR capsule Take 20 mg by mouth daily as needed  Unknown at Unknown time Yes Unknown, Entered By History   rizatriptan (MAXALT-MLT) 10 MG ODT Take 10 mg by mouth at onset of headache for migraine Unknown at Unknown time Yes Unknown, Entered By History

## 2021-09-05 NOTE — H&P
History and Physical     Aretha Zavala MRN# [id number]   YOB: 1980 Age: 40 year old      Date of Admission:  9/5/2021    Primary care provider: [facility], [place]          Assessment and Plan:   Aretha Zavala is a 40 year old female with a PMH significant for asthma, migraines, hypothyroidism, rheumatoid arthritis and GERD who presents with shortness of breath and cough.     Patient was discussed with Dr. Schmidt, who was provider in ED. Chart review of ED work up was reviewed as well as chart review of Care Everywhere, previous visits and admissions.    #Asthma exacerbation  Developed sore throat, runny nose and cough on 9/1 which has progressed to productive cough of yellow sputum, fever of 101 and significant shortness of breath.  She has been using albuterol inhaler, ibuprofen and prednisone 60 mg for 3 days without any improvement.  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  In the ED she is afebrile with temp 98.2, pulse 95, pressure 106/77 without hypoxia breathing 24 breaths/min.  She has frequent coughing during my exam and I am hearing some wheezes when she is coughing.  Chest x-ray does not show any evidence of infection and D-dimer is normal.  EKG shows a sinus tachycardia.  The test is negative.  In the ED she received albuterol nebulizer, 2 DuoNebs, magnesium sulfate and Solu-Medrol 125.    -Continue with Solu-Medrol 60 mg IV twice daily starting tomorrow morning  -Scheduled DuoNebs  -Albuterol nebulizer as needed  -We will have Tessalon Perles and guaifenesin available for cough  -Given fever yesterday as well as yellow sputum we will start her on doxycycline (can not due to azithromycin due to high liklihood of QTc prolongation with hydroxychloroquine)  -Oxygen support if needed  -LR at 100 ml/hr overnight for dehydration     #Hypokalemia  -Potassium replacement  protocol ordered    #RA  -Continue Hydroxychlorquine    #Depression  -Continue Wellbutrin    Social: No concerns  Code: Discussed with patient and they have chosen full code  VTE prophylaxis: PCDs  Disposition: Observation                    Chief Complaint:   Shortness of breath and cough         History of Present Illness:   Aretha Zavala is a 40 year old female who presents with intractable coughing and shortness of breath.  She states that this started with a sore throat, cough and runny nose on 9/1 but has progressively worsened with significant shortness of breath and intractable coughing.  She is coughing up yellow phlegm and had a fever of 101 yesterday.  She has a very poor appetite and feels dehydrated.  She had 1 loose stool a few days ago but none since with no vomiting.  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  Her  was very concerned about her respiratory status last night so she came in today.  He does not smoke cigarettes or drink alcohol.  She has noted that the frequency of her asthma symptoms is increasing and she is scheduled to see a pulmonologist in the near future.             Past Medical History:   Asthma  Anxiety/depression  Inflammatory arthritis  Migraine headaches  Hypothyroidism  GERD            Past Surgical History:   Tonsillectomy  C section x 2  Pelvic laparoscopy             Social History:     Social History     Socioeconomic History     Marital status:      Spouse name: Not on file     Number of children: Not on file     Years of education: Not on file     Highest education level: Not on file   Occupational History     Not on file   Tobacco Use     Smoking status: Not on file   Substance and Sexual Activity     Alcohol use: Not on file     Drug use: Not on file     Sexual activity: Not on file   Other Topics Concern     Not on file   Social History Narrative      Not on file     Social Determinants of Health     Financial Resource Strain:      Difficulty of Paying Living Expenses:    Food Insecurity:      Worried About Running Out of Food in the Last Year:      Ran Out of Food in the Last Year:    Transportation Needs:      Lack of Transportation (Medical):      Lack of Transportation (Non-Medical):    Physical Activity:      Days of Exercise per Week:      Minutes of Exercise per Session:    Stress:      Feeling of Stress :    Social Connections:      Frequency of Communication with Friends and Family:      Frequency of Social Gatherings with Friends and Family:      Attends Oriental orthodox Services:      Active Member of Clubs or Organizations:      Attends Club or Organization Meetings:      Marital Status:    Intimate Partner Violence:      Fear of Current or Ex-Partner:      Emotionally Abused:      Physically Abused:      Sexually Abused:                Family History:   Family history reviewed and is non contributory         Allergies:      Allergies   Allergen Reactions     Metoclopramide Palpitations     Reglan Difficulty breathing and Palpitations               Medications:     Prior to Admission medications    Medication Sig Last Dose Taking? Auth Provider   meclizine (ANTIVERT) 25 MG tablet Take 1 tablet (25 mg) by mouth 3 times daily as needed for dizziness   José Luis Perea PA-C              Review of Systems:   A Comprehensive greater than 10 system review of systems was carried out.  Pertinent positives and negatives are noted above.  Otherwise negative for contributory information.            Physical Exam:   Blood pressure 106/67, pulse 94, temperature 98.2  F (36.8  C), temperature source Oral, resp. rate 24, last menstrual period 08/22/2021, SpO2 98 %.  Exam:  GENERAL:  Comfortable.  PSYCH: pleasant, oriented, No acute distress.  HEENT:  PERRLA. Normal conjunctiva, normal hearing, nasal mucosa and Oropharynx are normal.  NECK:  Supple, no neck  vein distention, adenopathy or bruits, normal thyroid.  HEART:  Tachycardic with no murmur, no pericardial rub, gallops or S3 or S4.  LUNGS:  Intractable coughing with very few wheezes heard on exam  ABDOMEN:  Soft, no hepatosplenomegaly, normal bowel sounds. Non-tender, non distended.   EXTREMITIES:  No pedal edema, +2 pulses bilateral and equal.  SKIN:  Dry to touch, No rash, wound or ulcerations.  NEUROLOGIC:  CN 2-12 grossly intact,  sensation is intact with no focal deficits.               Data:     Recent Labs   Lab 09/05/21  1249   WBC 12.1*   HGB 14.9   HCT 43.6   MCV 91        Recent Labs   Lab 09/05/21  1217      POTASSIUM 3.0*   CHLORIDE 108   CO2 23   ANIONGAP 10   GLC 86   BUN 9   CR 0.79   GFRESTIMATED >90   UMU 9.3         Recent Results (from the past 24 hour(s))   XR Chest Port 1 View    Narrative    CHEST ONE VIEW PORTABLE   9/5/2021 1:02 PM     HISTORY:  Cough, chest pain, history of asthma.    COMPARISON: None.    FINDINGS:  The lungs are clear. No pleural effusions or pneumothorax.  Heart size and pulmonary vascularity are within normal limits. No  acute fracture.       Impression    IMPRESSION: No evidence of acute cardiopulmonary disease is seen.         Yun Tiwari PA-C

## 2021-09-05 NOTE — ED NOTES
[facility]  ED Nurse Handoff Report    Aretha Zavala is a 40 year old female   ED Chief complaint: Cough and Shortness of Breath  . ED Diagnosis:   Final diagnoses:   Exacerbation of asthma, unspecified asthma severity, unspecified whether persistent     Allergies:   Allergies   Allergen Reactions     Metoclopramide Palpitations     Reglan Difficulty breathing and Palpitations       Code Status: Full Code  Activity level - Baseline/Home:  Independent. Activity Level - Current:   Independent. Lift room needed: No. Bariatric: No   Needed: No   Isolation: No. Infection: Not Applicable.     Vital Signs:   Vitals:    09/05/21 1137 09/05/21 1200 09/05/21 1230 09/05/21 1400   BP: 106/77 112/69 104/60 106/67   Pulse: 95  89 94   Resp: 24      Temp: 98.2  F (36.8  C)      TempSrc: Oral      SpO2: 100% 100% 100% 98%       Cardiac Rhythm:  ,      Pain level:    Patient confused: No. Patient Falls Risk: No.   Elimination Status: Has voided   Patient Report - Initial Complaint:Pt arrives with c/o SOB. Pt is asthmatic, reports cough started on Wed. Pt has been using inhalers at home but reports little relief. 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 2 at home COVID tests have been negative.   Focused Assessment: Respiratory - Respiratory WDL: .WDL except; cough; rhythm/pattern  Rhythm/Pattern, Respiratory: shortness of breath; dyspnea on exertion  Breath Sounds: All Fields  Cough Frequency: frequent  Cough Type: good (occasionally productive) Secretions Amount: small  Secretions Color: yellow   Respiratory Secretions Assessment - Secretions Amount: small  Secretions Color: yellow   Head To Toe Assessment - All Lung Fields Breath Sounds: Posterior:; clear   Cardiac - Cardiac WDL: .WDL except; chest pain   Chest Pain Assessment - Rating (0-10): 6  Chest Pain Location: midsternal  Precipitating Factors:  (coughing) Associated Signs/Symptoms: dyspnea  Chest Pain Reproducible?: No    Chest Pain Assessment - Character: aching; pressure   Tests Performed: 12 lead EKG, Labs, COVID, Respiratory panel, CXR  Abnormal Results:   Abnormal Labs Reviewed   BASIC METABOLIC PANEL - Abnormal; Notable for the following components:       Result Value    Potassium 3.0 (*)     All other components within normal limits   CBC WITH PLATELETS AND DIFFERENTIAL - Abnormal; Notable for the following components:    WBC Count 12.1 (*)     Absolute Neutrophils 8.7 (*)     Absolute Immature Granulocytes 0.4 (*)     All other components within normal limits     XR Chest Port 1 View   Preliminary Result   IMPRESSION: No evidence of acute cardiopulmonary disease is seen.          Treatments provided: Duoneb x3, Solumedrol, Mg sulfate, 1L LR  Family Comments:  at bedside  OBS brochure/video discussed/provided to patient:  Yes  ED Medications:   Medications   lactated ringers BOLUS 1,000 mL (1,000 mLs Intravenous New Bag 9/5/21 1426)   ipratropium - albuterol 0.5 mg/2.5 mg/3 mL (DUONEB) 0.5-2.5 (3) MG/3ML neb solution (3 mLs  Given 9/5/21 1146)   methylPREDNISolone sodium succinate (solu-MEDROL) injection 125 mg (125 mg Intravenous Given 9/5/21 1210)   magnesium sulfate 2 g in water intermittent infusion (0 g Intravenous Stopped 9/5/21 1327)   ipratropium - albuterol 0.5 mg/2.5 mg/3 mL (DUONEB) neb solution 6 mL (6 mLs Nebulization Given 9/5/21 1210)   albuterol (PROVENTIL) neb solution 2.5 mg (2.5 mg Nebulization Given 9/5/21 1332)     Drips infusing:  No  For the majority of the shift, the patient's behavior Green. Interventions performed were N/A.    Sepsis treatment initiated: No     Patient tested for COVID 19 prior to admission: YES    ED Nurse Name/Phone Number: Erika Parks RN,   3:01 PM    RECEIVING UNIT ED HANDOFF REVIEW    Above ED Nurse Handoff Report was reviewed: Yes  Reviewed by: Yessenia Higgins RN on September 5, 2021 at 3:17 PM

## 2021-09-05 NOTE — PLAN OF CARE
ROOM # [id number]    Living Situation (if not independent, order SW consult): Lives with    Facility name:  : [facility] () [phone]    Activity level at baseline: Independent   Activity level on admit: SBA       Patient registered to observation; given Patient Bill of Rights; given the opportunity to ask questions about observation status and their plan of care.  Patient has been oriented to the observation room, bathroom and call light is in place.    Discussed discharge goals and expectations with patient/family.

## 2021-09-05 NOTE — ED TRIAGE NOTES
Pt arrives with c/o SOB. Pt is asthmatic, reports cough started on Wed. Pt has been using inhalers at home but reports little relief. 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 2 at home COVID tests have been negative.

## 2021-09-05 NOTE — PROGRESS NOTES
"PRIMARY DIAGNOSIS: ASTHMA  OUTPATIENT/OBSERVATION GOALS TO BE MET BEFORE DISCHARGE:  1. Vital signs stable: Yes    2. Improvement of peak flow to greater than 70% sustained off nebulizer for 4 hours: Yes    3. Dyspnea improved and O2 sats >88% at RA or at prior home O2 therapy level: RA, but SOB with exertion      SpO2: 100 %, O2 Device: None (Room air)    4. Short term supplemental O2 needed for use with activity at home: No    5. Tolerating adequate PO diet and medications: Yes    6. Return to near baseline physical activity: SOB with exertion and dry cough     Discharge Planner Nurse   Safe discharge environment identified: Yes  Barriers to discharge: Yes       Entered by: Yessenia Higgins 09/05/2021 5:28 PM     Please review provider order for any additional goals.   Nurse to notify provider when observation goals have been met and patient is ready for discharge.    Patient is alert and oriented x4. VS WNL and documented on the FS. Lung sounds clear in all lobes and patient is on RA. SOB with exertion. DuoNebs and albuterol nebs PRN available.  Patient has a dry non productive cough. Tessalon given for cough and patient also has Mucinex scheduled and guaifenesin PRN. Patient also on solu-MEDROL 62.5 mg Q12 hrs. Patient had yellow sputum yesterday and a fever and started on Doxycycline. IV fluids infusing at 100 ml/hr. Regular diet. Potassium at 3.0 and replaced. Potassium recheck at 2100. SBA when up.      /69 (BP Location: Left arm)   Pulse 115   Temp 97.9  F (36.6  C) (Oral)   Resp 16   Ht 1.6 m (5' 3\")   Wt 55.7 kg (122 lb 14.4 oz)   LMP 08/22/2021 (Approximate)   SpO2 100%   BMI 21.77 kg/m      "

## 2021-09-06 VITALS
RESPIRATION RATE: 18 BRPM | HEART RATE: 110 BPM | TEMPERATURE: 98.5 F | OXYGEN SATURATION: 100 % | DIASTOLIC BLOOD PRESSURE: 52 MMHG | BODY MASS INDEX: 21.78 KG/M2 | HEIGHT: 63 IN | SYSTOLIC BLOOD PRESSURE: 103 MMHG | WEIGHT: 122.9 LBS

## 2021-09-06 LAB
ANION GAP SERPL CALCULATED.3IONS-SCNC: 1 MMOL/L (ref 3–14)
ATRIAL RATE - MUSE: 85 BPM
BUN SERPL-MCNC: 10 MG/DL (ref 7–30)
C PNEUM DNA SPEC QL NAA+PROBE: NOT DETECTED
CALCIUM SERPL-MCNC: 8.8 MG/DL (ref 8.5–10.1)
CHLORIDE BLD-SCNC: 111 MMOL/L (ref 94–109)
CO2 SERPL-SCNC: 28 MMOL/L (ref 20–32)
CREAT SERPL-MCNC: 0.76 MG/DL (ref 0.52–1.04)
DIASTOLIC BLOOD PRESSURE - MUSE: NORMAL MMHG
ERYTHROCYTE [DISTWIDTH] IN BLOOD BY AUTOMATED COUNT: 12.6 % (ref 10–15)
FLUAV H1 2009 PAND RNA SPEC QL NAA+PROBE: NOT DETECTED
FLUAV H1 RNA SPEC QL NAA+PROBE: NOT DETECTED
FLUAV H3 RNA SPEC QL NAA+PROBE: NOT DETECTED
FLUAV RNA SPEC QL NAA+PROBE: NOT DETECTED
FLUBV RNA SPEC QL NAA+PROBE: NOT DETECTED
GFR SERPL CREATININE-BSD FRML MDRD: >90 ML/MIN/1.73M2
GLUCOSE BLD-MCNC: 114 MG/DL (ref 70–99)
HADV DNA SPEC QL NAA+PROBE: NOT DETECTED
HCOV PNL SPEC NAA+PROBE: NOT DETECTED
HCT VFR BLD AUTO: 37.9 % (ref 35–47)
HGB BLD-MCNC: 12.4 G/DL (ref 11.7–15.7)
HMPV RNA SPEC QL NAA+PROBE: NOT DETECTED
HPIV1 RNA SPEC QL NAA+PROBE: NOT DETECTED
HPIV2 RNA SPEC QL NAA+PROBE: NOT DETECTED
HPIV3 RNA SPEC QL NAA+PROBE: NOT DETECTED
HPIV4 RNA SPEC QL NAA+PROBE: NOT DETECTED
INTERPRETATION ECG - MUSE: NORMAL
M PNEUMO DNA SPEC QL NAA+PROBE: NOT DETECTED
MCH RBC QN AUTO: 30.1 PG (ref 26.5–33)
MCHC RBC AUTO-ENTMCNC: 32.7 G/DL (ref 31.5–36.5)
MCV RBC AUTO: 92 FL (ref 78–100)
P AXIS - MUSE: 83 DEGREES
PLATELET # BLD AUTO: 218 10E3/UL (ref 150–450)
POTASSIUM BLD-SCNC: 4.2 MMOL/L (ref 3.4–5.3)
PR INTERVAL - MUSE: 108 MS
QRS DURATION - MUSE: 74 MS
QT - MUSE: 372 MS
QTC - MUSE: 442 MS
R AXIS - MUSE: 82 DEGREES
RBC # BLD AUTO: 4.12 10E6/UL (ref 3.8–5.2)
RSV RNA SPEC QL NAA+PROBE: DETECTED
RSV RNA SPEC QL NAA+PROBE: NOT DETECTED
RV+EV RNA SPEC QL NAA+PROBE: NOT DETECTED
SODIUM SERPL-SCNC: 140 MMOL/L (ref 133–144)
SYSTOLIC BLOOD PRESSURE - MUSE: NORMAL MMHG
T AXIS - MUSE: 65 DEGREES
VENTRICULAR RATE- MUSE: 85 BPM
WBC # BLD AUTO: 13.1 10E3/UL (ref 4–11)

## 2021-09-06 PROCEDURE — 250N000011 HC RX IP 250 OP 636: Performed by: PHYSICIAN ASSISTANT

## 2021-09-06 PROCEDURE — G0378 HOSPITAL OBSERVATION PER HR: HCPCS

## 2021-09-06 PROCEDURE — 36415 COLL VENOUS BLD VENIPUNCTURE: CPT | Performed by: PHYSICIAN ASSISTANT

## 2021-09-06 PROCEDURE — 250N000009 HC RX 250: Performed by: PHYSICIAN ASSISTANT

## 2021-09-06 PROCEDURE — 250N000013 HC RX MED GY IP 250 OP 250 PS 637: Performed by: PHYSICIAN ASSISTANT

## 2021-09-06 PROCEDURE — 96376 TX/PRO/DX INJ SAME DRUG ADON: CPT

## 2021-09-06 PROCEDURE — 999N000157 HC STATISTIC RCP TIME EA 10 MIN

## 2021-09-06 PROCEDURE — 94640 AIRWAY INHALATION TREATMENT: CPT | Mod: 76

## 2021-09-06 PROCEDURE — 94640 AIRWAY INHALATION TREATMENT: CPT

## 2021-09-06 PROCEDURE — 99217 PR OBSERVATION CARE DISCHARGE: CPT | Performed by: HOSPITALIST

## 2021-09-06 PROCEDURE — 99207 PR CDG-CODE CATEGORY CHANGED: CPT | Performed by: HOSPITALIST

## 2021-09-06 PROCEDURE — 85027 COMPLETE CBC AUTOMATED: CPT | Performed by: PHYSICIAN ASSISTANT

## 2021-09-06 PROCEDURE — 80048 BASIC METABOLIC PNL TOTAL CA: CPT | Performed by: PHYSICIAN ASSISTANT

## 2021-09-06 RX ORDER — ALBUTEROL SULFATE 0.63 MG/3ML
1 SOLUTION RESPIRATORY (INHALATION) EVERY 6 HOURS PRN
Qty: 84 ML | Refills: 0 | Status: SHIPPED | OUTPATIENT
Start: 2021-09-06 | End: 2021-09-06

## 2021-09-06 RX ORDER — PREDNISONE 20 MG/1
TABLET ORAL
Qty: 20 TABLET | Refills: 0 | Status: SHIPPED | OUTPATIENT
Start: 2021-09-06 | End: 2021-09-06

## 2021-09-06 RX ORDER — ALBUTEROL SULFATE 0.63 MG/3ML
1 SOLUTION RESPIRATORY (INHALATION) EVERY 6 HOURS PRN
Qty: 84 ML | Refills: 0 | Status: SHIPPED | OUTPATIENT
Start: 2021-09-06

## 2021-09-06 RX ORDER — DOXYCYCLINE 100 MG/1
100 CAPSULE ORAL 2 TIMES DAILY
Qty: 10 CAPSULE | Refills: 0 | Status: SHIPPED | OUTPATIENT
Start: 2021-09-06 | End: 2021-09-06

## 2021-09-06 RX ORDER — GUAIFENESIN/DEXTROMETHORPHAN 100-10MG/5
5 SYRUP ORAL EVERY 4 HOURS PRN
Qty: 236 ML | Refills: 0 | Status: SHIPPED | OUTPATIENT
Start: 2021-09-06

## 2021-09-06 RX ORDER — PREDNISONE 20 MG/1
TABLET ORAL
Qty: 20 TABLET | Refills: 0 | Status: SHIPPED | OUTPATIENT
Start: 2021-09-06 | End: 2021-09-18

## 2021-09-06 RX ORDER — GUAIFENESIN/DEXTROMETHORPHAN 100-10MG/5
5 SYRUP ORAL EVERY 4 HOURS PRN
Qty: 236 ML | Refills: 0 | Status: SHIPPED | OUTPATIENT
Start: 2021-09-06 | End: 2021-09-06

## 2021-09-06 RX ORDER — DOXYCYCLINE 100 MG/1
100 CAPSULE ORAL 2 TIMES DAILY
Qty: 10 CAPSULE | Refills: 0 | Status: SHIPPED | OUTPATIENT
Start: 2021-09-06 | End: 2021-11-09

## 2021-09-06 RX ADMIN — IPRATROPIUM BROMIDE AND ALBUTEROL SULFATE 3 ML: .5; 3 SOLUTION RESPIRATORY (INHALATION) at 07:27

## 2021-09-06 RX ADMIN — ALBUTEROL SULFATE 2.5 MG: 2.5 SOLUTION RESPIRATORY (INHALATION) at 04:59

## 2021-09-06 RX ADMIN — Medication 1 LOZENGE: at 10:26

## 2021-09-06 RX ADMIN — DOXYCYCLINE HYCLATE 100 MG: 100 CAPSULE ORAL at 07:42

## 2021-09-06 RX ADMIN — METHYLPREDNISOLONE SODIUM SUCCINATE 62.5 MG: 125 INJECTION, POWDER, FOR SOLUTION INTRAMUSCULAR; INTRAVENOUS at 07:35

## 2021-09-06 RX ADMIN — DOCUSATE SODIUM AND SENNOSIDES 1 TABLET: 8.6; 5 TABLET, FILM COATED ORAL at 08:01

## 2021-09-06 RX ADMIN — IPRATROPIUM BROMIDE AND ALBUTEROL SULFATE 3 ML: .5; 3 SOLUTION RESPIRATORY (INHALATION) at 11:17

## 2021-09-06 RX ADMIN — GUAIFENESIN 600 MG: 600 TABLET, EXTENDED RELEASE ORAL at 07:42

## 2021-09-06 RX ADMIN — BENZONATATE 100 MG: 100 CAPSULE ORAL at 04:25

## 2021-09-06 RX ADMIN — Medication 1 LOZENGE: at 04:26

## 2021-09-06 NOTE — DISCHARGE SUMMARY
[facility]    Discharge Summary  Hospitalist    Date of Admission:  9/5/2021  Date of Discharge:  9/6/2021  Provider:  Tavon Davidson MD  Date of Service (when I last saw the patient): 09/06/21    Discharge Diagnoses   1.  Respiratory syncytial virus infection.  2.  Acute asthma exacerbation secondary to #1.  Other active health problems.  3.  Rheumatoid arthritis.  4.  Gastroesophageal reflux disease.  5.  Hypothyroidism.  6.  Depression.  7.  Migraines.     Other medical issues:  Asthma, RA, depression, GERD, migraines and hypothyroidism.     History of Present Illness   Aretha Zavala is an 40 year old female who presented with cough and worsening of shortness of breath.  Please see the admission history and physical for full details.    Hospital Course   Aretha Zavala was admitted on 9/5/2021.  The following problems were addressed during her hospitalization:    -RSV acute respiratory infection.  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  She has responded to symptomatic treatment with prednisone, bronchodilators nebulization and antitussive medication.  -Acute asthma exacerbation secondary to RSV infection.  She has responded to treatment as above.  She has mild leukocytosis with neutrophilia though she is afebrile.  She has been started empirically on doxycycline given her history of RA on Plaquenil.    # Discharge Pain Plan:    - Patient currently has NO PAIN and is not being prescribed pain medications on discharge.      Significant Results and Procedures   See below     Pending Results      Unresulted Labs Ordered in the Past 30 Days of this Admission     No orders found for last 31 day(s).          Code Status   Full Code       Primary Care Physician   [facility]    GEN:  Alert, oriented x 3, appears comfortable, NAD.  HEENT:  Normocephalic/atraumatic, no  scleral icterus, no nasal discharge, mouth moist.  CV:  Regular rate and rhythm, no murmur or JVD.  S1 + S2 noted, no S3 or S4.  LUNGS:  Clear to auscultation bilaterally without rales/rhonchi/wheezing/retractions.  Symmetric chest rise on inhalation noted.  ABD:  Active bowel sounds, soft, non-tender/non-distended.  No rebound/guarding/rigidity.  EXT:  No edema or cyanosis.  No joint synovitis noted.  SKIN:  Dry to touch, no exanthems noted in the visualized areas.     Discharge Disposition   Discharged to home    Consultations This Hospital Stay   None    Time Spent on this Encounter   I, Tavon Davidson MD, personally saw the patient today and spent greater than 30 minutes discharging this patient.     Discharge Orders   No discharge procedures on file.  Discharge Medications   Current Discharge Medication List      START taking these medications    Details   albuterol (ACCUNEB) 0.63 MG/3ML neb solution Take 3 mLs (0.63 mg) by nebulization every 6 hours as needed for shortness of breath / dyspnea or wheezing  Qty: 84 mL, Refills: 0    Associated Diagnoses: Exacerbation of asthma, unspecified asthma severity, unspecified whether persistent; Acute bronchitis due to respiratory syncytial virus (RSV)      doxycycline hyclate (VIBRAMYCIN) 100 MG capsule Take 1 capsule (100 mg) by mouth 2 times daily for 5 days  Qty: 10 capsule, Refills: 0    Comments: Future refills by PCP Dr. [facility] with phone number [phone].  Associated Diagnoses: Acute bronchitis due to respiratory syncytial virus (RSV)      guaiFENesin-dextromethorphan (ROBITUSSIN DM) 100-10 MG/5ML syrup Take 5 mLs by mouth every 4 hours as needed for cough  Qty: 236 mL, Refills: 0    Associated Diagnoses: Exacerbation of asthma, unspecified asthma severity, unspecified whether persistent; Acute bronchitis due to respiratory syncytial virus (RSV)      predniSONE (DELTASONE) 20 MG tablet Take 3 tablets (60 mg) by mouth daily for 3 days,  THEN 2 tablets (40 mg) daily for 3 days, THEN 1 tablet (20 mg) daily for 3 days, THEN 0.5 tablets (10 mg) daily for 3 days.  Qty: 20 tablet, Refills: 0    Associated Diagnoses: Exacerbation of asthma, unspecified asthma severity, unspecified whether persistent; Acute bronchitis due to respiratory syncytial virus (RSV)         CONTINUE these medications which have NOT CHANGED    Details   albuterol (PROAIR HFA/PROVENTIL HFA/VENTOLIN HFA) 108 (90 Base) MCG/ACT inhaler Inhale 2 puffs into the lungs every 6 hours as needed for shortness of breath / dyspnea or wheezing      budesonide-formoterol (SYMBICORT) 160-4.5 MCG/ACT Inhaler Inhale 2 puffs into the lungs 2 times daily      buPROPion (WELLBUTRIN XL) 300 MG 24 hr tablet Take 300 mg by mouth every evening      EPINEPHrine (EPIPEN IJ) Inject as directed once as needed      hydroxychloroquine (PLAQUENIL) 200 MG tablet Take 400 mg by mouth every evening      levocetirizine (XYZAL) 5 MG tablet Take 5 mg by mouth every evening      meclizine (ANTIVERT) 25 MG tablet Take 1 tablet (25 mg) by mouth 3 times daily as needed for dizziness  Qty: 30 tablet, Refills: 0      omeprazole (PRILOSEC) 20 MG DR capsule Take 20 mg by mouth daily as needed      rizatriptan (MAXALT-MLT) 10 MG ODT Take 10 mg by mouth at onset of headache for migraine           Allergies   Allergies   Allergen Reactions     Metoclopramide Palpitations     Reglan Difficulty breathing and Palpitations     Sudafed [Pseudoephedrine] Itching     Skin is crawling feeling     Data   Most Recent 3 CBC's:Recent Labs   Lab Test 09/06/21  0635 09/05/21  1249 06/08/21  1248   WBC 13.1* 12.1* 4.5   HGB 12.4 14.9 13.8   MCV 92 91 91    266 228      Most Recent 3 BMP's:  Recent Labs   Lab Test 09/06/21  0635 09/05/21  2050 09/05/21  1217 06/08/21  1248     --  141 142   POTASSIUM 4.2 3.6 3.0* 3.6   CHLORIDE 111*  --  108 109   CO2 28  --  23 27   BUN 10  --  9 12   CR 0.76  --  0.79 0.80   ANIONGAP 1*  --  10 6    UMU 8.8  --  9.3 8.7   *  --  86 93     Most Recent 2 LFT's:No lab results found.  Most Recent INR's and Anticoagulation Dosing History:  Anticoagulation Dose History    There is no flowsheet data to display.       Most Recent 3 Troponin's:  Recent Labs   Lab Test 09/05/21  1217   TROPONIN <0.015     Most Recent Cholesterol Panel:No lab results found.  Most Recent 6 Bacteria Isolates From Any Culture (See EPIC Reports for Culture Details):No lab results found.  Most Recent TSH, T4 and A1c Labs:No lab results found.  Results for orders placed or performed during the hospital encounter of 09/05/21   XR Chest Port 1 View    Narrative    CHEST ONE VIEW PORTABLE   9/5/2021 1:02 PM     HISTORY:  Cough, chest pain, history of asthma.    COMPARISON: None.    FINDINGS:  The lungs are clear. No pleural effusions or pneumothorax.  Heart size and pulmonary vascularity are within normal limits. No  acute fracture.       Impression    IMPRESSION: No evidence of acute cardiopulmonary disease is seen.    HESHAM DOMINGUEZ MD         SYSTEM ID:  [id number]         Disclaimer: This note consists of symbols derived from keyboarding, dictation and/or voice recognition software. As a result, there may be errors in the script that have gone undetected. Please consider this when interpreting information found in this chart.    .

## 2021-09-06 NOTE — PLAN OF CARE
PRIMARY DIAGNOSIS: RSV  OUTPATIENT/OBSERVATION GOALS TO BE MET BEFORE DISCHARGE:  Dyspnea improved and O2 sats >88% on RA or back to baseline O2 levels: Yes   SpO2: 97 %, O2 Device: None (Room air)    Tolerating oral abx or appropriate plans made outpatient infusion: Yes    Vitals signs normal or return to baseline: Yes    Short term supplemental O2 needed with activity at home: No    Tolerate oral intake to maintain hydration: Yes    Return to near baseline physical activity: Yes    Discharge Planner Nurse   Safe discharge environment identified: Yes  Barriers to discharge: No       Entered by: Roxi Jin 09/06/2021 7:54 AM     Please review provider order for any additional goals.   Nurse to notify provider when observation goals have been met and patient is ready for discharge.    Up ind, A&Ox4, tachy but had also just gotten up to bathroom, LS clear, room air, OCHOA, on doxy, scheduled nebs and mucinex, on IV solumedrol, nonproductive cough, last BM 9/3 - senokot to be given with breakfast, regular diet, plan of care reviewed, reports feeling much better overall, will continue to provide supportive cares.

## 2021-09-06 NOTE — PROVIDER NOTIFICATION
DATE:  9/6/2021   TIME OF RECEIPT FROM LAB:  0014  LAB TEST:  RESPIRATORY PANEL PCR  LAB VALUE:  + RSV  RESULTS GIVEN WITH READ-BACK TO (PROVIDER):  Admitting Pager (Hospitalist)  TIME LAB VALUE REPORTED TO PROVIDER:   0016

## 2021-09-06 NOTE — PLAN OF CARE
"PRIMARY DIAGNOSIS: ASTHMA EXACERBATION  OUTPATIENT/OBSERVATION GOALS TO BE MET BEFORE DISCHARGE:  1. Vital signs stable: Yes    2. Improvement of peak flow to greater than 70% sustained off nebulizer for 4 hours: Yes    3. Dyspnea improved and O2 sats >88% at RA or at prior home O2 therapy level: Yes      SpO2: 98 %, O2 Device: None (Room air)    4. Short term supplemental O2 needed for use with activity at home: No    5. Tolerating adequate PO diet and medications: Yes    6. Return to near baseline physical activity: Yes    BP 96/53 (BP Location: Left arm)   Pulse 98   Temp 98.2  F (36.8  C) (Oral)   Resp 16   Ht 1.6 m (5' 3\")   Wt 55.7 kg (122 lb 14.4 oz)   LMP 08/22/2021 (Approximate)   SpO2 100%   BMI 21.77 kg/m      Discharge Planner Nurse   Safe discharge environment identified: Yes  Barriers to discharge: Yes       Entered by: Fawn Downing 09/06/2021 12:57 AM     Please review provider order for any additional goals.   Nurse to notify provider when observation goals have been met and patient is ready for discharge.    Patient alert and oriented x4. Reports dyspnea on exertion and shortness of breath with coughing spells. PRN tessalon and chloraseptic lozenge administered for productive cough, as well as scheduled mucinex, duoneb, and levocetirizine. LS clear on auscultation. Ibuprofen administered with relief for HA. Up SBA. Regular diet. Patient requested to be SL overnight and states she is drinking lots of water, SL. Respiratory panel positive for RSV, MD aware. Will continue with plan of care.   "

## 2021-09-06 NOTE — PLAN OF CARE
"PRIMARY DIAGNOSIS: ASTHMA EXACERBATION  OUTPATIENT/OBSERVATION GOALS TO BE MET BEFORE DISCHARGE:  1. Vital signs stable: Yes    2. Improvement of peak flow to greater than 70% sustained off nebulizer for 4 hours: Yes    3. Dyspnea improved and O2 sats >88% at RA or at prior home O2 therapy level: Yes      SpO2: 98 %, O2 Device: None (Room air)    4. Short term supplemental O2 needed for use with activity at home: No    5. Tolerating adequate PO diet and medications: Yes    6. Return to near baseline physical activity: Yes    /59 (BP Location: Left arm)   Pulse 98   Temp 98.6  F (37  C) (Tympanic)   Resp 16   Ht 1.6 m (5' 3\")   Wt 55.7 kg (122 lb 14.4 oz)   LMP 08/22/2021 (Approximate)   SpO2 98%   BMI 21.77 kg/m      Discharge Planner Nurse   Safe discharge environment identified: Yes  Barriers to discharge: Yes       Entered by: Fawn Dwoning 09/05/2021 9:25 PM     Please review provider order for any additional goals.   Nurse to notify provider when observation goals have been met and patient is ready for discharge.    Patient alert and oriented x4. Denies pain, reports dyspnea on exertion and shortness of breath with coughing spells. PRN tessalon and chloraseptic lozenge administered for productive cough, as well as scheduled mucinex, duoneb, and levocetirizine. LS clear on auscultation. Up SBA. Regular diet. IVF infusing at 100 mL/hr. Will continue with plan of care.   "

## 2021-09-06 NOTE — PLAN OF CARE
Patient's After Visit Summary was reviewed with patient.   Patient verbalized understanding of After Visit Summary, recommended follow up and was given an opportunity to ask questions.   Discharge medications sent home with patient/family: sent to pt's rx  Discharged with:     OBSERVATION patient END time: 1211

## 2021-09-06 NOTE — PLAN OF CARE
"PRIMARY DIAGNOSIS: ASTHMA EXACERBATION/RSV+  OUTPATIENT/OBSERVATION GOALS TO BE MET BEFORE DISCHARGE:  1. Vital signs stable: Yes    2. Improvement of peak flow to greater than 70% sustained off nebulizer for 4 hours: Yes    3. Dyspnea improved and O2 sats >88% at RA or at prior home O2 therapy level: Yes      SpO2: 100 %, O2 Device: None (Room air)    4. Short term supplemental O2 needed for use with activity at home: No    5. Tolerating adequate PO diet and medications: Yes    6. Return to near baseline physical activity: Yes    /59 (BP Location: Right arm)   Pulse 92   Temp 98.4  F (36.9  C) (Oral)   Resp 16   Ht 1.6 m (5' 3\")   Wt 55.7 kg (122 lb 14.4 oz)   LMP 08/22/2021 (Approximate)   SpO2 100%   BMI 21.77 kg/m      Discharge Planner Nurse   Safe discharge environment identified: Yes  Barriers to discharge: Yes       Entered by: Fawn Downing 09/06/2021 4:34 AM     Please review provider order for any additional goals.   Nurse to notify provider when observation goals have been met and patient is ready for discharge.    Patient alert and oriented x4. Reports dyspnea on exertion and shortness of breath with coughing spells. PRN tessalon and chloraseptic lozenge administered for productive cough, as well as scheduled mucinex, duoneb, and levocetirizine. LS clear on auscultation. Patient states she is feeling \"much better\". Ibuprofen administered with relief for HA. Up independently in room. Regular diet. Patient requested to be SL overnight - states she is drinking lots of water and does not feel she needs IV fluids, refused to be hooked up to fluids again upon request, IV SL. Respiratory panel positive for RSV, MD aware. Will continue with plan of care.   "

## 2021-10-09 ENCOUNTER — HEALTH MAINTENANCE LETTER (OUTPATIENT)
Age: 41
End: 2021-10-09

## 2021-11-03 NOTE — PATIENT INSTRUCTIONS
1. You were seen in the ENT Clinic today by Dr. Nissen.  If you have any questions or concerns after your appointment, please call   - Option 1: ENT Clinic: [phone]   - Option 2: Roxi (Dr. Nissen's Nurse): [phone]                   Aretha(Dr. Nissen's Nurse): [phone]      2.   Plan to return to clinic as needed    3. Vestibular Therapy- [place]    4. Referral to Neurology      Roix Oakley LPN  [facility] - Otolaryngology

## 2021-11-08 DIAGNOSIS — R42 DIZZINESS: Primary | ICD-10-CM

## 2021-11-09 ENCOUNTER — OFFICE VISIT (OUTPATIENT)
Dept: OTOLARYNGOLOGY | Facility: CLINIC | Age: 41
End: 2021-11-09
Attending: FAMILY MEDICINE
Payer: COMMERCIAL

## 2021-11-09 ENCOUNTER — OFFICE VISIT (OUTPATIENT)
Dept: AUDIOLOGY | Facility: CLINIC | Age: 41
End: 2021-11-09
Attending: OTOLARYNGOLOGY
Payer: COMMERCIAL

## 2021-11-09 VITALS
HEART RATE: 85 BPM | WEIGHT: 118 LBS | HEIGHT: 63 IN | TEMPERATURE: 98.3 F | BODY MASS INDEX: 20.91 KG/M2 | OXYGEN SATURATION: 100 %

## 2021-11-09 DIAGNOSIS — H93.13 TINNITUS, BILATERAL: ICD-10-CM

## 2021-11-09 DIAGNOSIS — R42 DIZZINESS: ICD-10-CM

## 2021-11-09 DIAGNOSIS — R42 DIZZINESS: Primary | ICD-10-CM

## 2021-11-09 DIAGNOSIS — R42 DIZZINESS AND GIDDINESS: Primary | ICD-10-CM

## 2021-11-09 PROCEDURE — 92550 TYMPANOMETRY & REFLEX THRESH: CPT | Performed by: AUDIOLOGIST-HEARING AID FITTER

## 2021-11-09 PROCEDURE — 92504 EAR MICROSCOPY EXAMINATION: CPT | Performed by: OTOLARYNGOLOGY

## 2021-11-09 PROCEDURE — 92557 COMPREHENSIVE HEARING TEST: CPT | Performed by: AUDIOLOGIST-HEARING AID FITTER

## 2021-11-09 PROCEDURE — 99244 OFF/OP CNSLTJ NEW/EST MOD 40: CPT | Mod: 25 | Performed by: OTOLARYNGOLOGY

## 2021-11-09 ASSESSMENT — MIFFLIN-ST. JEOR: SCORE: 1179.37

## 2021-11-09 ASSESSMENT — PAIN SCALES - GENERAL: PAINLEVEL: MODERATE PAIN (4)

## 2021-11-09 NOTE — NURSING NOTE
"Chief Complaint   Patient presents with     Consult     vertigo      Pulse 85, temperature 98.3  F (36.8  C), height 1.6 m (5' 3\"), weight 53.5 kg (118 lb), SpO2 100 %.    Ger Humphrey LPN    "

## 2021-11-09 NOTE — PROGRESS NOTES
Dear Dr. [facility], [place]:    I had the pleasure of meeting Aretha Zavala in consultation today at the [facility] at your request.    CHIEF COMPLAINT: Dizziness    HISTORY OF PRESENT ILLNESS: Patient is a 40-year-old in today for assessment of dizziness.  She has had occasional balance and dizziness problems in the past.  She has had increased problems with the past 6 months for which she comes in for assessment.  She describes episodes where she seems to pull toward the left, notices visually that the room seems to shift.  This can last anywhere from just seconds to even hours.  She is able to function, just has to be careful.  She does notice she often has headaches frequently during the attacks or after.  She denies any dysphagia, hoarseness, facial paresthesias.  She does have bilateral tinnitus but not problematic.  Feels her hearing is equal and symmetrical, does not notice any significant hearing issues.    ALLERGIES:    Allergies   Allergen Reactions     Metoclopramide Palpitations     Reglan Difficulty breathing and Palpitations     Sudafed [Pseudoephedrine] Itching     Skin is crawling feeling       HABITS: Social History    Substance and Sexual Activity      Alcohol use: Yes        Comment: 2 drinks per month     History   Smoking Status     Never Smoker   Smokeless Tobacco     Never Used         PAST MEDICAL HISTORY: Please see today's intake form (for the remainder of the PMH) which I reviewed and signed.  History reviewed. No pertinent past medical history.    FAMILY HISTORY/SOCIAL HISTORY: History reviewed. No pertinent family history.   Social History     Socioeconomic History     Marital status:      Spouse name: Not on file     Number of children: Not on file     Years of education: Not on file     Highest education level: Not on file   Occupational History     Not on file   Tobacco Use     Smoking status: Never Smoker     Smokeless tobacco:  Never Used   Substance and Sexual Activity     Alcohol use: Yes     Comment: 2 drinks per month     Drug use: Not on file     Sexual activity: Not on file   Other Topics Concern     Not on file   Social History Narrative     Not on file     Social Determinants of Health     Financial Resource Strain: Not on file   Food Insecurity: Not on file   Transportation Needs: Not on file   Physical Activity: Not on file   Stress: Not on file   Social Connections: Not on file   Intimate Partner Violence: Not on file   Housing Stability: Not on file       REVIEW OF SYSTEMS: Patient Supplied Answers to Review of Systems   ENT ROS 11/9/2021   Constitutional Problems with sleep   Neurology Dizzy spells, Headache   Ears, Nose, Throat Ringing/noise in ears, Nasal congestion or drainage, Hoarseness            The remainder of the 10 point ROS is negative    PHYSICIAL EXAMINATION:  Constitutional: The patient was well-groomed and in no acute distress.   Skin: Warm and pink.  Psychiatric: The patient's affect was calm, cooperative, and appropriate.   Respiratory: Breathing comfortably without stridor or exertion of accessory muscles.  Eyes: Pupils were equal and reactive. Extraocular movement intact.   Head: Normocephalic and atraumatic. No lesions or scars.  Ears: Patient placed under the microscope for microscopic evaluation and cleaning of cerumen which was obscuring full visualization and complete assessment of both TMs. Under high power magnification, the right ear was examined and cleaned of cerumen using curet, alligator forceps, and suction.  After cleaning, TM is fully visualized and has normal position with normal middle ear aeration. The left ear was then cleaned and inspected using microscope, instruments and similar techniques. After cleaning of cerumen, the TM has normal position with normal aeration to middle ear.  Nose: Sinuses were nontender. Anterior rhinoscopy revealed midline septum and absence of purulence or  polyps.  Oral Cavity: Normal tongue, floor of mouth, buccal mucosa, and palate. No lesions or masses on inspection or palpation. No abnormal lymph tissue in the oropharynx.   Neck: The parotid is soft without masses. Supple with normal laryngeal and tracheal landmarks.   Lymphatic: There is no palpable lymphadenopathy or other masses in the neck.   Neurologic: Alert and oriented x 3. Cranial nerves III-XI within normal limits. Voice quality normal.  Cerebellar Function Tests:  Grossly normal    Audiogram: Audiogram performed shows normal hearing of both ears through all frequencies.  Excellent discrimination at 96% in each ear.  Normal type A tympanograms bilaterally.  Tuning forks are normal.      IMPRESSION AND PLAN:   1. Dizziness: Discussed this with her in detail.  I do not think the dizziness seems to be from an otologic cause based on her description of symptoms, lack of any associated auditory symptoms, and normal hearing as well as vestibular testing both from a peripheral and central standpoint.  Calorics normal.  She has headaches frequently with the dizziness and certainly wonder about migraine associated vertigo and we discussed that in detail.  She has appointment with neurology and she will follow through with that.  2. Bilateral tinnitus: Not problematic, no treatment needed, monitor.  3. Migraine/headaches: Feel this is likely cause for the dizziness.  Follow through with neurology appointment which is scheduled in the near future.    Thank you very much for the opportunity to participate in the care of your patient.    Rick L Nissen MD

## 2021-12-28 NOTE — TELEPHONE ENCOUNTER
FUTURE VISIT INFORMATION      FUTURE VISIT INFORMATION:    Date: 1/20/2022    Time: 730am    [place] [facility]  [place]ERRAL INFORMATION:    Referring provider:  Dr. Nissen    Referring providers clinic:  [facility]     Reason for visit/diagnosis  Migraines and Dizziness     RECORDS REQUESTED FROM:       Clinic name Comments Records Status Imaging Status   Internal Dr. Nissen-11/9/2021    ED Visit-6/8/2021    [facility]         [facility] Dr. Castillo-3/6/2021    MR Brain-4/19/2021 Care Everywhere Requested to PACS                        12/28/2021-[facility] for [facility]    1/12/2022-[facility] Images now in PACS-MR @ 552am

## 2022-01-20 ENCOUNTER — VIRTUAL VISIT (OUTPATIENT)
Dept: NEUROLOGY | Facility: CLINIC | Age: 42
End: 2022-01-20
Attending: OTOLARYNGOLOGY
Payer: COMMERCIAL

## 2022-01-20 ENCOUNTER — PRE VISIT (OUTPATIENT)
Dept: NEUROLOGY | Facility: CLINIC | Age: 42
End: 2022-01-20

## 2022-01-20 ENCOUNTER — TELEPHONE (OUTPATIENT)
Dept: NEUROLOGY | Facility: CLINIC | Age: 42
End: 2022-01-20

## 2022-01-20 DIAGNOSIS — M79.18 CERVICAL MYOFASCIAL PAIN SYNDROME: ICD-10-CM

## 2022-01-20 DIAGNOSIS — G43.109 MIGRAINE WITH VERTIGO: Primary | ICD-10-CM

## 2022-01-20 DIAGNOSIS — M26.609 TMJ (TEMPOROMANDIBULAR JOINT DISORDER): ICD-10-CM

## 2022-01-20 DIAGNOSIS — U07.1 INFECTION DUE TO 2019 NOVEL CORONAVIRUS: ICD-10-CM

## 2022-01-20 PROCEDURE — 99205 OFFICE O/P NEW HI 60 MIN: CPT | Mod: 95 | Performed by: NURSE PRACTITIONER

## 2022-01-20 RX ORDER — ONDANSETRON 4 MG/1
4 TABLET, ORALLY DISINTEGRATING ORAL EVERY 8 HOURS PRN
Qty: 20 TABLET | Refills: 3 | Status: SHIPPED | OUTPATIENT
Start: 2022-01-20

## 2022-01-20 RX ORDER — SUMATRIPTAN 20 MG/1
1 SPRAY NASAL
Qty: 6 EACH | Refills: 9 | Status: SHIPPED | OUTPATIENT
Start: 2022-01-20 | End: 2022-01-26

## 2022-01-20 ASSESSMENT — HEADACHE IMPACT TEST (HIT 6)
HOW OFTEN DO HEADACHES LIMIT YOUR DAILY ACTIVITIES: SOMETIMES
HOW OFTEN HAVE YOU FELT TOO TIRED TO WORK BECAUSE OF YOUR HEADACHES: SOMETIMES
HOW OFTEN HAVE YOU FELT FED UP OR IRRITATED BECAUSE OF YOUR HEADACHES: VERY OFTEN
HOW OFTEN DID HEADACHS LIMIT CONCENTRATION ON WORK OR DAILY ACTIVITY: SOMETIMES
HIT6 TOTAL SCORE: 62
WHEN YOU HAVE HEADACHES HOW OFTEN IS THE PAIN SEVERE: SOMETIMES
WHEN YOU HAVE A HEADACHE HOW OFTEN DO YOU WISH YOU COULD LIE DOWN: VERY OFTEN
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
## 2022-01-20 NOTE — TELEPHONE ENCOUNTER
Prior Authorization Retail Medication Request    Medication/Dose: Rimegepant Sulfate 75 MG TBDP  ICD code (if different than what is on RX):  G43.109  Previously Tried and Failed:  See Chart  Rationale:  See CHart    Insurance Name:  [facility]  Insurance ID:  [id number]       Pharmacy Information (if different than what is on RX)  Name:    Phone:

## 2022-01-21 NOTE — TELEPHONE ENCOUNTER
Central Prior Authorization Team   Phone: [phone]      [place] Initiation    Medication: Rimegepant Sulfate 75 MG TBDP  Insurance Company: [employer] - Phone [phone] Fax [phone]  Pharmacy Filling the Rx: [id number] [place], [place] - [address] [place] [address] [address] AT [facility]  Filling Pharmacy Phone: [phone]  Filling Pharmacy Fax:    Start Date: 1/21/2022

## 2022-01-23 NOTE — TELEPHONE ENCOUNTER
PRIOR AUTHORIZATION DENIED    Medication: Rimegepant Sulfate 75 MG TBDP-DENIED    Denial Date: 1/23/2022    Denial Rational:           Appeal Information:

## 2022-01-25 NOTE — TELEPHONE ENCOUNTER
Medication Appeal Initiation    We have initiated an appeal for the requested medication:  Medication: Rimegepant Sulfate 75 MG TBDP-APPEAL Pending  Appeal Start Date:  1/25/2022  Insurance Company: [employer] [place] - Phone [phone] fax [phone]  Comments:  Appeal and denial letter faxed

## 2022-01-31 NOTE — TELEPHONE ENCOUNTER
[employer] [place] called back with appeal status. Appeal was received on 1/25/22 and pending. Determination can take 30-45 days. Appeal case# [id number].

## 2022-01-31 NOTE — TELEPHONE ENCOUNTER
Called [facility] [place] for appeal status however the call wait time is over 60 mins. Had to option to leave a number for a call back.

## 2022-02-18 NOTE — TELEPHONE ENCOUNTER
MEDICATION APPEAL APPROVED    Medication: Rimegepant Sulfate 75 MG TBDP-APPEAL APPROVED  Authorization Effective Date: 1/21/2022  Authorization Expiration Date: 2/27/2023  Approved Dose/Quantity:   Reference #: [id number]   Insurance Company: [employer] [place] - Phone [phone] Fax [phone]  Expected CoPay:       CoPay Card Available:      Foundation Assistance Needed:    Which Pharmacy is filling the prescription (Not needed for infusion/clinic administered): [id number] - [place], [place] - [address] [place] [address] [address] AT [facility]    Appeal has been overturned and approved. Pharmacy notified and will notify patient when ready.

## 2022-05-21 ENCOUNTER — HEALTH MAINTENANCE LETTER (OUTPATIENT)
Age: 42
End: 2022-05-21

## 2022-09-17 ENCOUNTER — HEALTH MAINTENANCE LETTER (OUTPATIENT)
Age: 42
End: 2022-09-17

## 2023-06-04 ENCOUNTER — HEALTH MAINTENANCE LETTER (OUTPATIENT)
Age: 43
End: 2023-06-04

## 2024-02-24 ENCOUNTER — HEALTH MAINTENANCE LETTER (OUTPATIENT)
Age: 44
End: 2024-02-24

## 2024-05-06 ENCOUNTER — HOSPITAL ENCOUNTER (EMERGENCY)
Facility: CLINIC | Age: 44
Discharge: HOME | End: 2024-05-06
Attending: EMERGENCY MEDICINE | Admitting: EMERGENCY MEDICINE
Payer: COMMERCIAL

## 2024-05-06 VITALS
OXYGEN SATURATION: 100 % | SYSTOLIC BLOOD PRESSURE: 110 MMHG | HEART RATE: 87 BPM | TEMPERATURE: 99 F | RESPIRATION RATE: 20 BRPM | WEIGHT: 120 LBS | BODY MASS INDEX: 21.26 KG/M2 | HEIGHT: 63 IN | DIASTOLIC BLOOD PRESSURE: 73 MMHG

## 2024-05-06 DIAGNOSIS — T78.40XA ALLERGIC REACTION, INITIAL ENCOUNTER: ICD-10-CM

## 2024-05-06 PROCEDURE — 250N000011 HC RX IP 250 OP 636: Performed by: EMERGENCY MEDICINE

## 2024-05-06 PROCEDURE — 96375 TX/PRO/DX INJ NEW DRUG ADDON: CPT

## 2024-05-06 PROCEDURE — 96374 THER/PROPH/DIAG INJ IV PUSH: CPT

## 2024-05-06 PROCEDURE — 99284 EMERGENCY DEPT VISIT MOD MDM: CPT | Mod: 25

## 2024-05-06 RX ORDER — ONDANSETRON 2 MG/ML
4 INJECTION INTRAMUSCULAR; INTRAVENOUS ONCE
Status: COMPLETED | OUTPATIENT
Start: 2024-05-06 | End: 2024-05-06

## 2024-05-06 RX ORDER — EPINEPHRINE 0.3 MG/.3ML
0.3 INJECTION SUBCUTANEOUS
Qty: 2 EACH | Refills: 0 | Status: SHIPPED | OUTPATIENT
Start: 2024-05-06

## 2024-05-06 RX ORDER — METHYLPREDNISOLONE SODIUM SUCCINATE 125 MG/2ML
125 INJECTION, POWDER, LYOPHILIZED, FOR SOLUTION INTRAMUSCULAR; INTRAVENOUS ONCE
Status: COMPLETED | OUTPATIENT
Start: 2024-05-06 | End: 2024-05-06

## 2024-05-06 RX ADMIN — ONDANSETRON 4 MG: 2 INJECTION INTRAMUSCULAR; INTRAVENOUS at 17:25

## 2024-05-06 RX ADMIN — FAMOTIDINE 20 MG: 10 INJECTION, SOLUTION INTRAVENOUS at 17:15

## 2024-05-06 RX ADMIN — METHYLPREDNISOLONE SODIUM SUCCINATE 125 MG: 125 INJECTION, POWDER, FOR SOLUTION INTRAMUSCULAR; INTRAVENOUS at 17:17

## 2024-05-06 ASSESSMENT — COLUMBIA-SUICIDE SEVERITY RATING SCALE - C-SSRS
2. HAVE YOU ACTUALLY HAD ANY THOUGHTS OF KILLING YOURSELF IN THE PAST MONTH?: NO
6. HAVE YOU EVER DONE ANYTHING, STARTED TO DO ANYTHING, OR PREPARED TO DO ANYTHING TO END YOUR LIFE?: NO
1. IN THE PAST MONTH, HAVE YOU WISHED YOU WERE DEAD OR WISHED YOU COULD GO TO SLEEP AND NOT WAKE UP?: NO

## 2024-05-06 ASSESSMENT — ACTIVITIES OF DAILY LIVING (ADL)
ADLS_ACUITY_SCORE: 35

## 2024-05-06 NOTE — ED TRIAGE NOTES
Pt arrives for allergic reaction. Had eyes dilated at eye doctor around 1445 and after started feeling lip swelling and throat swelling. Hoarse voice in triage. Took epi pen at 1520, slight improvement. Stomach hurting. AVSS on RA,

## 2024-05-06 NOTE — ED PROVIDER NOTES
Emergency Department Note      History of Present Illness     Chief Complaint  Allergic Reaction    HPI  Aretha Zavala is a 43 year old female who presents today for possible interaction.  She states that around 2 PM, 2 hours prior to vitals she was in her eyes dilated.  Shortly after developed lip swelling and tingling sensation throughout her face.  45 minutes prior to arrival she yourself EpiPen and Benadryl.  States she feels better abdominal distal swollen.  States she feels dry mouth and tingling throughout.  Denies any shortness of breath or wheezing.  No nausea or vomiting.  She states she has known allergy to Reglan and wasps.  States that she may have had anaphylaxis to a wasp bite.  Patient is unsure of what was placed in her eyes for the eye dilation.  Denies being injected with any medications or any oral medications.    Independent Historian  None    Review of External Notes  Seen in outside ER for chest pain December 30 2023  Past Medical History   Medical History and Problem List  No past medical history on file.    Medications  EPINEPHrine (ANY BX GENERIC EQUIV) 0.3 MG/0.3ML injection 2-pack  albuterol (ACCUNEB) 0.63 MG/3ML neb solution  albuterol (PROAIR HFA/PROVENTIL HFA/VENTOLIN HFA) 108 (90 Base) MCG/ACT inhaler  budesonide-formoterol (SYMBICORT) 160-4.5 MCG/ACT Inhaler  buPROPion (WELLBUTRIN XL) 300 MG 24 hr tablet  guaiFENesin-dextromethorphan (ROBITUSSIN DM) 100-10 MG/5ML syrup  hydroxychloroquine (PLAQUENIL) 200 MG tablet  levocetirizine (XYZAL) 5 MG tablet  meclizine (ANTIVERT) 25 MG tablet  omeprazole (PRILOSEC) 20 MG DR capsule  ondansetron (ZOFRAN-ODT) 4 MG ODT tab  Rimegepant Sulfate 75 MG TBDP        Surgical History   No past surgical history on file.  Physical Exam   Patient Vitals for the past 24 hrs:   BP Temp Temp src Pulse Resp SpO2 Height Weight   05/06/24 1819 110/73 -- -- 87 -- 100 % -- --   05/06/24 1738 101/65 -- -- 85 -- 99 % -- --   05/06/24 1730 -- -- -- -- -- 100 %  "-- --   05/06/24 1700 -- -- -- -- -- 96 % -- --   05/06/24 1544 (!) 148/83 99  F (37.2  C) Temporal 103 20 100 % 1.6 m (5' 3\") 54.4 kg (120 lb)     Physical Exam  Vitals reviewed.   Constitutional:       General: She is not in acute distress.     Appearance: She is not ill-appearing.   HENT:      Head: Normocephalic and atraumatic.      Mouth/Throat:      Mouth: Mucous membranes are moist.      Pharynx: No oropharyngeal exudate or posterior oropharyngeal erythema.      Comments: Uvula midline.  No uvula swelling.  No tongue swelling.  Mild lip swelling to the bottom lip however no other areas of swelling noted.  Airway is patent.  Eyes:      Extraocular Movements: Extraocular movements intact.      Pupils: Pupils are equal, round, and reactive to light.   Cardiovascular:      Rate and Rhythm: Normal rate and regular rhythm.   Pulmonary:      Effort: Pulmonary effort is normal. No respiratory distress.      Breath sounds: Normal breath sounds. No wheezing.   Abdominal:      Palpations: Abdomen is soft.      Tenderness: There is no abdominal tenderness. There is no guarding.   Musculoskeletal:      Cervical back: Normal range of motion.   Skin:     General: Skin is warm and dry.   Neurological:      Mental Status: She is alert and oriented to person, place, and time.      GCS: GCS eye subscore is 4. GCS verbal subscore is 5. GCS motor subscore is 6.   Psychiatric:         Behavior: Behavior normal.         Diagnostics   Lab Results   Labs Ordered and Resulted from Time of ED Arrival to Time of ED Departure - No data to display    Imaging  No orders to display         ED Course    Medications Administered  Medications   methylPREDNISolone sodium succinate (solu-MEDROL) injection 125 mg (125 mg Intravenous $Given 5/6/24 1717)   famotidine (PEPCID) injection 20 mg (20 mg Intravenous $Given 5/6/24 1715)   ondansetron (ZOFRAN) injection 4 mg (4 mg Intravenous $Given 5/6/24 1725)       Procedures  Procedures     Discussion " of Management  None    Social Determinants of Health adding to complexity of care  None    ED Course     Medical Decision Making / Diagnosis   CMS Diagnoses: None    MIPS     None    MDM  Aretha Zavala is a 43 year old female who presents today for concerns of possible allergic reaction.  She had her eyes dilated earlier today.  Shortly after she developed swelling to the lips and sensation of swelling to her throat.  She gave herself an EpiPen approximately 45 minutes prior to arrival.  She also took Benadryl.  She notes that she has mild swelling to the lower lip.  On exam her airway is patent.  No uvula swelling or lip swelling.  No tongue swelling.  Her lungs are clear to auscultation.  She reports no nausea or vomiting.  I discussed with the patient plan for steroids and Pepcid.  Patient was monitored in the ER for 3 hours.  No acute changes.  She had no recurrence of the lip swelling.  She remained hemodynamically stable.  Discussed plan for discharge.  She requested a prescription for an EpiPen.  Discussed continuing Benadryl as needed for 6 hours at home.  She was instructed to follow-up with her PCP in 1 to 2 days.  She verbalized understanding agreement.    Disposition  The patient was discharged.     ICD-10 Codes:    ICD-10-CM    1. Allergic reaction, initial encounter  T78.40XA            Discharge Medications  Discharge Medication List as of 5/6/2024  6:18 PM            Anette Treadwell DO    [employer]      Anette Treadwell DO  05/06/24 9364

## 2024-05-06 NOTE — ED NOTES
Pt has received pepcid, zofran, and solu-medrol. States she feels like meds are working, and does not feel as swollen.

## 2024-05-06 NOTE — ED NOTES
PIV removed. Pt changed into street clothes. AVS printed and reviewed with patient/family. Allowed time for questions. All questions answered. GCS 15 and ambulatory upon discharge.

## 2024-05-06 NOTE — DISCHARGE INSTRUCTIONS
Continue Benadryl as needed every 6 hours    Follow-up with your primary care doctor in 1 to 2 days    Return to the ER for any worsening or concerning symptoms    
17-May-2022 13:08

## 2024-07-13 ENCOUNTER — HEALTH MAINTENANCE LETTER (OUTPATIENT)
Age: 44
End: 2024-07-13

## 2025-07-19 ENCOUNTER — HEALTH MAINTENANCE LETTER (OUTPATIENT)
Age: 45
End: 2025-07-19

## 2025-07-29 ENCOUNTER — APPOINTMENT (OUTPATIENT)
Dept: GENERAL RADIOLOGY | Facility: CLINIC | Age: 45
End: 2025-07-29
Attending: EMERGENCY MEDICINE
Payer: COMMERCIAL

## 2025-07-29 ENCOUNTER — HOSPITAL ENCOUNTER (EMERGENCY)
Facility: CLINIC | Age: 45
Discharge: HOME | End: 2025-07-30
Attending: STUDENT IN AN ORGANIZED HEALTH CARE EDUCATION/TRAINING PROGRAM | Admitting: STUDENT IN AN ORGANIZED HEALTH CARE EDUCATION/TRAINING PROGRAM
Payer: COMMERCIAL

## 2025-07-29 DIAGNOSIS — S69.92XA HAND INJURY, LEFT, INITIAL ENCOUNTER: ICD-10-CM

## 2025-07-29 DIAGNOSIS — M79.642 PAIN OF LEFT HAND: Primary | ICD-10-CM

## 2025-07-29 PROCEDURE — 99283 EMERGENCY DEPT VISIT LOW MDM: CPT | Performed by: STUDENT IN AN ORGANIZED HEALTH CARE EDUCATION/TRAINING PROGRAM

## 2025-07-29 PROCEDURE — 73130 X-RAY EXAM OF HAND: CPT | Mod: LT

## 2025-07-29 PROCEDURE — 29125 APPL SHORT ARM SPLINT STATIC: CPT | Mod: LT

## 2025-07-29 ASSESSMENT — COLUMBIA-SUICIDE SEVERITY RATING SCALE - C-SSRS
2. HAVE YOU ACTUALLY HAD ANY THOUGHTS OF KILLING YOURSELF IN THE PAST MONTH?: NO
1. IN THE PAST MONTH, HAVE YOU WISHED YOU WERE DEAD OR WISHED YOU COULD GO TO SLEEP AND NOT WAKE UP?: NO
6. HAVE YOU EVER DONE ANYTHING, STARTED TO DO ANYTHING, OR PREPARED TO DO ANYTHING TO END YOUR LIFE?: NO

## 2025-07-30 VITALS
BODY MASS INDEX: 23.09 KG/M2 | HEIGHT: 63 IN | HEART RATE: 78 BPM | SYSTOLIC BLOOD PRESSURE: 109 MMHG | WEIGHT: 130.29 LBS | DIASTOLIC BLOOD PRESSURE: 86 MMHG | OXYGEN SATURATION: 99 % | RESPIRATION RATE: 18 BRPM | TEMPERATURE: 97.3 F

## 2025-07-30 ASSESSMENT — ACTIVITIES OF DAILY LIVING (ADL): ADLS_ACUITY_SCORE: 46

## 2025-07-30 NOTE — ED TRIAGE NOTES
Pt presents to triage with c/o fell through her deck and injured her L hand. Fell 3 feet, caught self with L hand. Can't move L thumb.

## 2025-07-30 NOTE — ED PROVIDER NOTES
"  Emergency Department Note      History of Present Illness     Chief Complaint   Hand Injury      HPI   Aretha Zavala is a very pleasant 44 year old female presenting with a hand injury. The patient reports that around  she was remodeling a deck when she stepped on a board believed to be attached, but when it gave way she fell straight through the deck. In an attempt to catch herself on the way down, she injured her left hand. Pain is localized near the left hand and shoots up the wrist. Patient denies pain in left shoulder, left elbow or neck.     Independent Historian   None    Review of External Notes   None.    Past Medical History     Medical History and Problem List   Anxiety  Depression  Chronic inflammatory arthritis  Mild persistent asthma  Hypogammaglobulinemia  Subclinical hypothyroidism    Medications   Mirena  Ritalin  Concerta  Deltasone  Spiriva respimat  Viibryd  Symbicort  Wellbutrin XL  Plaquenil  Xyzal      Surgical History   Tonsillectomy    Breast surgery  Carpal tunnel release    Physical Exam     Patient Vitals for the past 24 hrs:   BP Temp Resp SpO2 Height Weight   25 2322 (!) 133/93 97.3  F (36.3  C) 18 99 % 1.6 m (5' 3\") 59.1 kg (130 lb 4.7 oz)     Physical Exam  Vitals and nursing note reviewed.   Constitutional:       Appearance: Normal appearance.   Cardiovascular:      Rate and Rhythm: Normal rate.      Pulses: Normal pulses.   Musculoskeletal:         General: Tenderness present. No swelling or deformity.      Comments: Tenderness to palpation over the anatomic snuffbox of left hand and thenar eminence.   No tenderness to palpation over the left distal radius or ulna.  No tenderness to palpation through left shoulder or left elbow.   Skin:     General: Skin is warm and dry.      Capillary Refill: Capillary refill takes less than 2 seconds.      Findings: No bruising or erythema.   Neurological:      Mental Status: She is alert.      Sensory: Sensory deficit " (Numbness/tingling in palmar aspect of left thumb and index finger) present.      Motor: Weakness (Patient is unable to flex index finger fully.  Unable to abduct left thumb) present.           Diagnostics     Lab Results   Labs Ordered and Resulted from Time of ED Arrival to Time of ED Departure - No data to display    Imaging   XR Hand Left G/E 3 Views   Final Result   IMPRESSION: Normal joint spaces and alignment. No fracture.        Independent Interpretation   X-ray of left hand shows no fracture.    ED Course      Medications Administered   Medications - No data to display    Procedures   Procedures     Splint Placement     Procedure: Splint Placement     Indication: Suspected fracture    Consent: Verbal     Location: Left Hand    Preparation: None    Procedure detail:    Splint was applied by Tech/Nurse under my supervision  Splint type: Thumb spica   Splint materilal: Fiberglass  After placement I checked and adjusted the fit as needed to ensure proper positioning/fit   Sensation and circulation are intact after splint placement     Patient Status: The patient tolerated the procedure well: Yes. There were no complications.    Discussion of Management   None    ED Course   ED Course as of 07/30/25 0102   Wed Jul 30, 2025   0023 I obtained history and examined the patient as noted above     0037 I rechecked the patient.         Additional Documentation  None    Medical Decision Making / Diagnosis     CMS Diagnoses: None    MIPS   None               MDM   Aretha Zavala is a 44 year old female presenting with left hand injury after fall.  Patient is having difficulty with movement of the left thumb and index finger, with some decreased sensation in these digits. X-ray of the hand was negative for acute fracture or dislocation.  Considered differential including median nerve injury or anterior interosseous nerve injury, or occult scaphoid fracture.  Due to this concern, I did place the patient in a thumb spica  splint.  I placed a referral to hand surgery for reevaluation in the next few days.  Additional instructions were provided to the patient.  Return precautions were provided.        Disposition   The patient was discharged.     Diagnosis     ICD-10-CM    1. Pain of left hand  M79.642       2. Hand injury, left, initial encounter  S69.92XA            Discharge Medications   New Prescriptions    No medications on file         Scribe Disclosure:  I, Norman Arrieta, am serving as a scribe at 11:53 PM on 7/29/2025 to document services personally performed by Buddy Kowalski MD based on my observations and the provider's statements to me.       Buddy Kowalski MD  07/30/25 0106

## 2025-07-30 NOTE — DISCHARGE INSTRUCTIONS
Thank you for allowing us to evaluate you today.  Follow up with orthopedics in 2 days for reevaluation.. I have placed a referral for you.   For pain, use acetaminophen (Tylenol) and ibuprofen.  Keep the splint dry and minimize use of your left hand  Please read the guidance provided with your discharge instructions.  Immediately return to the emergency department with any concerns.